# Patient Record
Sex: FEMALE | Race: WHITE | NOT HISPANIC OR LATINO
[De-identification: names, ages, dates, MRNs, and addresses within clinical notes are randomized per-mention and may not be internally consistent; named-entity substitution may affect disease eponyms.]

---

## 2018-09-16 ENCOUNTER — RX RENEWAL (OUTPATIENT)
Age: 83
End: 2018-09-16

## 2018-09-17 ENCOUNTER — APPOINTMENT (OUTPATIENT)
Dept: HEART AND VASCULAR | Facility: CLINIC | Age: 83
End: 2018-09-17

## 2018-10-10 ENCOUNTER — APPOINTMENT (OUTPATIENT)
Dept: HEART AND VASCULAR | Facility: CLINIC | Age: 83
End: 2018-10-10
Payer: MEDICARE

## 2018-10-10 ENCOUNTER — LABORATORY RESULT (OUTPATIENT)
Age: 83
End: 2018-10-10

## 2018-10-10 VITALS — DIASTOLIC BLOOD PRESSURE: 80 MMHG | SYSTOLIC BLOOD PRESSURE: 140 MMHG

## 2018-10-10 VITALS — WEIGHT: 165 LBS | BODY MASS INDEX: 27.49 KG/M2 | HEIGHT: 65 IN

## 2018-10-10 DIAGNOSIS — Z87.891 PERSONAL HISTORY OF NICOTINE DEPENDENCE: ICD-10-CM

## 2018-10-10 DIAGNOSIS — Z00.00 ENCOUNTER FOR GENERAL ADULT MEDICAL EXAMINATION W/OUT ABNORMAL FINDINGS: ICD-10-CM

## 2018-10-10 DIAGNOSIS — E03.9 HYPOTHYROIDISM, UNSPECIFIED: ICD-10-CM

## 2018-10-10 DIAGNOSIS — R09.89 OTHER SPECIFIED SYMPTOMS AND SIGNS INVOLVING THE CIRCULATORY AND RESPIRATORY SYSTEMS: ICD-10-CM

## 2018-10-10 DIAGNOSIS — E78.5 HYPERLIPIDEMIA, UNSPECIFIED: ICD-10-CM

## 2018-10-10 DIAGNOSIS — I25.2 OLD MYOCARDIAL INFARCTION: ICD-10-CM

## 2018-10-10 DIAGNOSIS — N18.9 CHRONIC KIDNEY DISEASE, UNSPECIFIED: ICD-10-CM

## 2018-10-10 DIAGNOSIS — I25.9 CHRONIC ISCHEMIC HEART DISEASE, UNSPECIFIED: ICD-10-CM

## 2018-10-10 DIAGNOSIS — R55 SYNCOPE AND COLLAPSE: ICD-10-CM

## 2018-10-10 PROCEDURE — 93880 EXTRACRANIAL BILAT STUDY: CPT

## 2018-10-10 PROCEDURE — 99214 OFFICE O/P EST MOD 30 MIN: CPT

## 2018-10-10 PROCEDURE — 93000 ELECTROCARDIOGRAM COMPLETE: CPT

## 2018-10-10 PROCEDURE — 93306 TTE W/DOPPLER COMPLETE: CPT

## 2018-10-10 RX ORDER — QUETIAPINE FUMARATE 25 MG/1
25 TABLET ORAL
Qty: 90 | Refills: 3 | Status: ACTIVE | COMMUNITY
Start: 2018-10-10 | End: 1900-01-01

## 2018-10-10 RX ORDER — LEVOTHYROXINE SODIUM 0.03 MG/1
25 TABLET ORAL DAILY
Qty: 90 | Refills: 3 | Status: ACTIVE | COMMUNITY
Start: 1900-01-01 | End: 1900-01-01

## 2018-10-11 LAB
ALBUMIN SERPL ELPH-MCNC: 4.2 G/DL
ALP BLD-CCNC: 87 U/L
ALT SERPL-CCNC: 9 U/L
ANION GAP SERPL CALC-SCNC: 17 MMOL/L
AST SERPL-CCNC: 13 U/L
BASOPHILS # BLD AUTO: 0.04 K/UL
BASOPHILS NFR BLD AUTO: 0.6 %
BILIRUB SERPL-MCNC: 0.4 MG/DL
BUN SERPL-MCNC: 22 MG/DL
CALCIUM SERPL-MCNC: 9.4 MG/DL
CHLORIDE SERPL-SCNC: 106 MMOL/L
CHOLEST SERPL-MCNC: 126 MG/DL
CHOLEST/HDLC SERPL: 3 RATIO
CO2 SERPL-SCNC: 20 MMOL/L
CREAT SERPL-MCNC: 1.82 MG/DL
EOSINOPHIL # BLD AUTO: 0.37 K/UL
EOSINOPHIL NFR BLD AUTO: 5.8 %
FOLATE SERPL-MCNC: 18.7 NG/ML
GLUCOSE SERPL-MCNC: 91 MG/DL
HBA1C MFR BLD HPLC: 5.4 %
HCT VFR BLD CALC: 36.6 %
HDLC SERPL-MCNC: 42 MG/DL
HGB BLD-MCNC: 11.3 G/DL
IMM GRANULOCYTES NFR BLD AUTO: 0.2 %
LDLC SERPL CALC-MCNC: 60 MG/DL
LYMPHOCYTES # BLD AUTO: 1.99 K/UL
LYMPHOCYTES NFR BLD AUTO: 31 %
MAN DIFF?: NORMAL
MCHC RBC-ENTMCNC: 30.5 PG
MCHC RBC-ENTMCNC: 30.9 GM/DL
MCV RBC AUTO: 98.9 FL
MONOCYTES # BLD AUTO: 0.48 K/UL
MONOCYTES NFR BLD AUTO: 7.5 %
NEUTROPHILS # BLD AUTO: 3.53 K/UL
NEUTROPHILS NFR BLD AUTO: 54.9 %
PLATELET # BLD AUTO: 171 K/UL
POTASSIUM SERPL-SCNC: 4.5 MMOL/L
PROT SERPL-MCNC: 7 G/DL
RBC # BLD: 3.7 M/UL
RBC # FLD: 14.3 %
SODIUM SERPL-SCNC: 143 MMOL/L
T3RU NFR SERPL: 1.16 INDEX
T4 FREE SERPL-MCNC: 0.9 NG/DL
TRIGL SERPL-MCNC: 120 MG/DL
TSH SERPL-ACNC: 8.93 UIU/ML
VIT B12 SERPL-MCNC: 449 PG/ML
WBC # FLD AUTO: 6.42 K/UL

## 2018-11-08 ENCOUNTER — RX RENEWAL (OUTPATIENT)
Age: 83
End: 2018-11-08

## 2019-01-10 ENCOUNTER — EMERGENCY (EMERGENCY)
Facility: HOSPITAL | Age: 84
LOS: 0 days | Discharge: HOME | End: 2019-01-10
Attending: EMERGENCY MEDICINE | Admitting: EMERGENCY MEDICINE

## 2019-01-10 VITALS
DIASTOLIC BLOOD PRESSURE: 78 MMHG | RESPIRATION RATE: 18 BRPM | SYSTOLIC BLOOD PRESSURE: 142 MMHG | TEMPERATURE: 96 F | HEART RATE: 99 BPM | OXYGEN SATURATION: 98 %

## 2019-01-10 VITALS
RESPIRATION RATE: 20 BRPM | SYSTOLIC BLOOD PRESSURE: 180 MMHG | TEMPERATURE: 98 F | DIASTOLIC BLOOD PRESSURE: 78 MMHG | OXYGEN SATURATION: 98 % | HEART RATE: 63 BPM

## 2019-01-10 DIAGNOSIS — K59.00 CONSTIPATION, UNSPECIFIED: ICD-10-CM

## 2019-01-10 DIAGNOSIS — Z88.0 ALLERGY STATUS TO PENICILLIN: ICD-10-CM

## 2019-01-10 DIAGNOSIS — I10 ESSENTIAL (PRIMARY) HYPERTENSION: ICD-10-CM

## 2019-01-10 DIAGNOSIS — E03.9 HYPOTHYROIDISM, UNSPECIFIED: ICD-10-CM

## 2019-01-10 DIAGNOSIS — Z91.012 ALLERGY TO EGGS: ICD-10-CM

## 2019-01-10 LAB
ALBUMIN SERPL ELPH-MCNC: 3.9 G/DL — SIGNIFICANT CHANGE UP (ref 3.5–5.2)
ALP SERPL-CCNC: 83 U/L — SIGNIFICANT CHANGE UP (ref 30–115)
ALT FLD-CCNC: 9 U/L — SIGNIFICANT CHANGE UP (ref 0–41)
ANION GAP SERPL CALC-SCNC: 19 MMOL/L — HIGH (ref 7–14)
AST SERPL-CCNC: 23 U/L — SIGNIFICANT CHANGE UP (ref 0–41)
BASOPHILS # BLD AUTO: 0.04 K/UL — SIGNIFICANT CHANGE UP (ref 0–0.2)
BASOPHILS NFR BLD AUTO: 0.6 % — SIGNIFICANT CHANGE UP (ref 0–1)
BILIRUB SERPL-MCNC: 0.5 MG/DL — SIGNIFICANT CHANGE UP (ref 0.2–1.2)
BUN SERPL-MCNC: 20 MG/DL — SIGNIFICANT CHANGE UP (ref 10–20)
CALCIUM SERPL-MCNC: 9.4 MG/DL — SIGNIFICANT CHANGE UP (ref 8.5–10.1)
CHLORIDE SERPL-SCNC: 102 MMOL/L — SIGNIFICANT CHANGE UP (ref 98–110)
CO2 SERPL-SCNC: 20 MMOL/L — SIGNIFICANT CHANGE UP (ref 17–32)
CREAT SERPL-MCNC: 2 MG/DL — HIGH (ref 0.7–1.5)
EOSINOPHIL # BLD AUTO: 0.11 K/UL — SIGNIFICANT CHANGE UP (ref 0–0.7)
EOSINOPHIL NFR BLD AUTO: 1.7 % — SIGNIFICANT CHANGE UP (ref 0–8)
GLUCOSE SERPL-MCNC: 101 MG/DL — HIGH (ref 70–99)
HCT VFR BLD CALC: 35.4 % — LOW (ref 37–47)
HGB BLD-MCNC: 11.5 G/DL — LOW (ref 12–16)
IMM GRANULOCYTES NFR BLD AUTO: 0.3 % — SIGNIFICANT CHANGE UP (ref 0.1–0.3)
LACTATE SERPL-SCNC: 1.5 MMOL/L — SIGNIFICANT CHANGE UP (ref 0.5–2.2)
LIDOCAIN IGE QN: 52 U/L — SIGNIFICANT CHANGE UP (ref 7–60)
LYMPHOCYTES # BLD AUTO: 1.65 K/UL — SIGNIFICANT CHANGE UP (ref 1.2–3.4)
LYMPHOCYTES # BLD AUTO: 25 % — SIGNIFICANT CHANGE UP (ref 20.5–51.1)
MCHC RBC-ENTMCNC: 29.6 PG — SIGNIFICANT CHANGE UP (ref 27–31)
MCHC RBC-ENTMCNC: 32.5 G/DL — SIGNIFICANT CHANGE UP (ref 32–37)
MCV RBC AUTO: 91.2 FL — SIGNIFICANT CHANGE UP (ref 81–99)
MONOCYTES # BLD AUTO: 0.54 K/UL — SIGNIFICANT CHANGE UP (ref 0.1–0.6)
MONOCYTES NFR BLD AUTO: 8.2 % — SIGNIFICANT CHANGE UP (ref 1.7–9.3)
NEUTROPHILS # BLD AUTO: 4.24 K/UL — SIGNIFICANT CHANGE UP (ref 1.4–6.5)
NEUTROPHILS NFR BLD AUTO: 64.2 % — SIGNIFICANT CHANGE UP (ref 42.2–75.2)
NRBC # BLD: 0 /100 WBCS — SIGNIFICANT CHANGE UP (ref 0–0)
PLATELET # BLD AUTO: 171 K/UL — SIGNIFICANT CHANGE UP (ref 130–400)
POTASSIUM SERPL-MCNC: 4.7 MMOL/L — SIGNIFICANT CHANGE UP (ref 3.5–5)
POTASSIUM SERPL-SCNC: 4.7 MMOL/L — SIGNIFICANT CHANGE UP (ref 3.5–5)
PROT SERPL-MCNC: 7.4 G/DL — SIGNIFICANT CHANGE UP (ref 6–8)
RBC # BLD: 3.88 M/UL — LOW (ref 4.2–5.4)
RBC # FLD: 14 % — SIGNIFICANT CHANGE UP (ref 11.5–14.5)
SODIUM SERPL-SCNC: 141 MMOL/L — SIGNIFICANT CHANGE UP (ref 135–146)
WBC # BLD: 6.6 K/UL — SIGNIFICANT CHANGE UP (ref 4.8–10.8)
WBC # FLD AUTO: 6.6 K/UL — SIGNIFICANT CHANGE UP (ref 4.8–10.8)

## 2019-01-10 RX ORDER — SODIUM CHLORIDE 9 MG/ML
1000 INJECTION, SOLUTION INTRAVENOUS
Qty: 0 | Refills: 0 | Status: DISCONTINUED | OUTPATIENT
Start: 2019-01-10 | End: 2019-01-10

## 2019-01-10 RX ORDER — SENNA PLUS 8.6 MG/1
2 TABLET ORAL ONCE
Qty: 0 | Refills: 0 | Status: COMPLETED | OUTPATIENT
Start: 2019-01-10 | End: 2019-01-10

## 2019-01-10 RX ORDER — MULTIVIT WITH MIN/MFOLATE/K2 340-15/3 G
0.5 POWDER (GRAM) ORAL ONCE
Qty: 0 | Refills: 0 | Status: COMPLETED | OUTPATIENT
Start: 2019-01-10 | End: 2019-01-10

## 2019-01-10 RX ORDER — SENNA PLUS 8.6 MG/1
2 TABLET ORAL
Qty: 20 | Refills: 0 | OUTPATIENT
Start: 2019-01-10 | End: 2019-01-19

## 2019-01-10 RX ORDER — IOHEXOL 300 MG/ML
30 INJECTION, SOLUTION INTRAVENOUS ONCE
Qty: 0 | Refills: 0 | Status: COMPLETED | OUTPATIENT
Start: 2019-01-10 | End: 2019-01-10

## 2019-01-10 RX ADMIN — SODIUM CHLORIDE 125 MILLILITER(S): 9 INJECTION, SOLUTION INTRAVENOUS at 15:18

## 2019-01-10 RX ADMIN — Medication 1 ENEMA: at 20:01

## 2019-01-10 RX ADMIN — IOHEXOL 30 MILLILITER(S): 300 INJECTION, SOLUTION INTRAVENOUS at 15:09

## 2019-01-10 RX ADMIN — SENNA PLUS 2 TABLET(S): 8.6 TABLET ORAL at 20:02

## 2019-01-10 RX ADMIN — Medication 0.5 BOTTLE: at 20:01

## 2019-01-10 NOTE — ED PROVIDER NOTE - PHYSICAL EXAMINATION
VITAL SIGNS: I have reviewed nursing notes and confirm.  CONSTITUTIONAL: Well-developed; well-nourished; in no acute distress.  SKIN: Skin exam is warm and dry, no acute rash.  HEAD: Normocephalic; atraumatic.  EYES: PERRL, EOM intact; conjunctiva and sclera clear.  ENT: No nasal discharge; airway clear. TMs clear.  NECK: Supple; non tender.  CARD: S1, S2 normal; no murmurs, gallops, or rubs. Regular rate and rhythm.  RESP: No wheezes, rales or rhonchi.  ABD: Normal bowel sounds; soft; non-distended; non-tender; +brown stool in rectum  EXT: Normal ROM. No clubbing, cyanosis or edema.

## 2019-01-10 NOTE — ED PROVIDER NOTE - PROGRESS NOTE DETAILS
ordered medications for constipation, pt prefers to take at home and have bm at home.  pt to f/u with dr. gonsalves
17-Feb-2018 11:27

## 2019-01-10 NOTE — ED PROVIDER NOTE - OBJECTIVE STATEMENT
86 yo f with pmh of cad on plavix, htn, hypothyroidism, presents with 1week of constipation.  pt has not had a bm x 1 week.  pt went to dr. James RYAN a few days ago and was told to double up on her miralax and colace.  pt admits feels distended, but no abd pain.  as per family, pt was very regular with her BM until sudden change 2 months ago.

## 2019-01-10 NOTE — ED PROVIDER NOTE - NS ED ROS FT
Review of Systems:  	•	CONSTITUTIONAL - no fever, no diaphoresis, no weight change  	•	SKIN - no rash  	•	HEMATOLOGIC - no bleeding, no bruising  	•	EYES - no eye pain, no blurred vision  	•	ENT - no change in hearing, no pain  	•	RESPIRATORY - no shortness of breath, no cough  	•	CARDIAC - no chest pain, no palpitations  	•	GI - no abd pain, no nausea, no vomiting, no diarrhea, + constipation, no bleeding  	•	 - no dysuria, no hematuria, no flank pain, no urinary retention  	•	MUSCULOSKELETAL - no joint paint, no swelling, no redness  	•	NEUROLOGIC - no weakness, no headache, no paresthesias, no dizziness  All other systems negative, unless specified in HPI

## 2019-01-10 NOTE — ED PROVIDER NOTE - NSFOLLOWUPINSTRUCTIONS_ED_ALL_ED_FT
Constipation    Constipation is when a person has fewer than three bowel movements a week, has difficulty having a bowel movement, or has stools that are dry, hard, or larger than normal. Other symptoms can include abdominal pain or bloating. As people grow older, constipation is more common. A low-fiber diet, not taking in enough fluids, and taking certain medicines, including opioid painkillers, may make constipation worse. Treatment varies but may include dietary modifications (more fiber-rich foods), lifestyle modifications, and possible medications.     SEEK IMMEDIATE MEDICAL CARE IF YOU HAVE ANY OF THE FOLLOWING SYMPTOMS: bright red blood in your stool, constipation for longer than 4 days, abdominal or rectal pain, unexplained weight loss, or inability to pass gas.

## 2019-01-10 NOTE — ED PROVIDER NOTE - MEDICAL DECISION MAKING DETAILS
pt with constipation x 1 week.  only tolerated initial fecal disimpaction.  labs show ckd and abd shows constipation.  pt will f/u with dr. gonsalves

## 2019-01-21 ENCOUNTER — RX RENEWAL (OUTPATIENT)
Age: 84
End: 2019-01-21

## 2019-01-29 ENCOUNTER — APPOINTMENT (OUTPATIENT)
Dept: HEART AND VASCULAR | Facility: CLINIC | Age: 84
End: 2019-01-29

## 2019-02-11 ENCOUNTER — RX RENEWAL (OUTPATIENT)
Age: 84
End: 2019-02-11

## 2019-03-02 ENCOUNTER — EMERGENCY (EMERGENCY)
Facility: HOSPITAL | Age: 84
LOS: 0 days | Discharge: HOME | End: 2019-03-02
Attending: EMERGENCY MEDICINE | Admitting: EMERGENCY MEDICINE

## 2019-03-02 VITALS
HEART RATE: 74 BPM | HEIGHT: 62 IN | WEIGHT: 139.99 LBS | DIASTOLIC BLOOD PRESSURE: 74 MMHG | RESPIRATION RATE: 18 BRPM | SYSTOLIC BLOOD PRESSURE: 178 MMHG | OXYGEN SATURATION: 97 % | TEMPERATURE: 99 F

## 2019-03-02 VITALS
DIASTOLIC BLOOD PRESSURE: 72 MMHG | SYSTOLIC BLOOD PRESSURE: 168 MMHG | OXYGEN SATURATION: 97 % | HEART RATE: 72 BPM | TEMPERATURE: 99 F | RESPIRATION RATE: 18 BRPM

## 2019-03-02 DIAGNOSIS — F03.90 UNSPECIFIED DEMENTIA WITHOUT BEHAVIORAL DISTURBANCE: ICD-10-CM

## 2019-03-02 DIAGNOSIS — Z79.899 OTHER LONG TERM (CURRENT) DRUG THERAPY: ICD-10-CM

## 2019-03-02 DIAGNOSIS — E03.9 HYPOTHYROIDISM, UNSPECIFIED: ICD-10-CM

## 2019-03-02 DIAGNOSIS — I10 ESSENTIAL (PRIMARY) HYPERTENSION: ICD-10-CM

## 2019-03-02 DIAGNOSIS — Z88.8 ALLERGY STATUS TO OTHER DRUGS, MEDICAMENTS AND BIOLOGICAL SUBSTANCES: ICD-10-CM

## 2019-03-02 DIAGNOSIS — G31.89 OTHER SPECIFIED DEGENERATIVE DISEASES OF NERVOUS SYSTEM: ICD-10-CM

## 2019-03-02 DIAGNOSIS — Z88.0 ALLERGY STATUS TO PENICILLIN: ICD-10-CM

## 2019-03-02 DIAGNOSIS — Z91.012 ALLERGY TO EGGS: ICD-10-CM

## 2019-03-02 LAB
ALBUMIN SERPL ELPH-MCNC: 3.9 G/DL — SIGNIFICANT CHANGE UP (ref 3.5–5.2)
ALP SERPL-CCNC: 67 U/L — SIGNIFICANT CHANGE UP (ref 30–115)
ALT FLD-CCNC: 7 U/L — SIGNIFICANT CHANGE UP (ref 0–41)
ANION GAP SERPL CALC-SCNC: 9 MMOL/L — SIGNIFICANT CHANGE UP (ref 7–14)
APPEARANCE UR: CLEAR — SIGNIFICANT CHANGE UP
APTT BLD: 29.7 SEC — SIGNIFICANT CHANGE UP (ref 27–39.2)
AST SERPL-CCNC: 15 U/L — SIGNIFICANT CHANGE UP (ref 0–41)
BACTERIA # UR AUTO: ABNORMAL
BASOPHILS # BLD AUTO: 0.05 K/UL — SIGNIFICANT CHANGE UP (ref 0–0.2)
BASOPHILS NFR BLD AUTO: 0.8 % — SIGNIFICANT CHANGE UP (ref 0–1)
BILIRUB SERPL-MCNC: 0.5 MG/DL — SIGNIFICANT CHANGE UP (ref 0.2–1.2)
BILIRUB UR-MCNC: NEGATIVE — SIGNIFICANT CHANGE UP
BUN SERPL-MCNC: 19 MG/DL — SIGNIFICANT CHANGE UP (ref 10–20)
CALCIUM SERPL-MCNC: 9.2 MG/DL — SIGNIFICANT CHANGE UP (ref 8.5–10.1)
CHLORIDE SERPL-SCNC: 107 MMOL/L — SIGNIFICANT CHANGE UP (ref 98–110)
CO2 SERPL-SCNC: 26 MMOL/L — SIGNIFICANT CHANGE UP (ref 17–32)
COD CRY URNS QL: NEGATIVE — SIGNIFICANT CHANGE UP
COLOR SPEC: YELLOW — SIGNIFICANT CHANGE UP
CREAT SERPL-MCNC: 1.5 MG/DL — SIGNIFICANT CHANGE UP (ref 0.7–1.5)
DIFF PNL FLD: NEGATIVE — SIGNIFICANT CHANGE UP
EOSINOPHIL # BLD AUTO: 0.33 K/UL — SIGNIFICANT CHANGE UP (ref 0–0.7)
EOSINOPHIL NFR BLD AUTO: 5.5 % — SIGNIFICANT CHANGE UP (ref 0–8)
EPI CELLS # UR: ABNORMAL /HPF
GLUCOSE SERPL-MCNC: 103 MG/DL — HIGH (ref 70–99)
GLUCOSE UR QL: NEGATIVE MG/DL — SIGNIFICANT CHANGE UP
GRAN CASTS # UR COMP ASSIST: NEGATIVE — SIGNIFICANT CHANGE UP
HCT VFR BLD CALC: 36.6 % — LOW (ref 37–47)
HGB BLD-MCNC: 11.5 G/DL — LOW (ref 12–16)
HYALINE CASTS # UR AUTO: NEGATIVE — SIGNIFICANT CHANGE UP
IMM GRANULOCYTES NFR BLD AUTO: 0.2 % — SIGNIFICANT CHANGE UP (ref 0.1–0.3)
INR BLD: 1.01 RATIO — SIGNIFICANT CHANGE UP (ref 0.65–1.3)
KETONES UR-MCNC: ABNORMAL
LACTATE SERPL-SCNC: 1.1 MMOL/L — SIGNIFICANT CHANGE UP (ref 0.5–2.2)
LEUKOCYTE ESTERASE UR-ACNC: NEGATIVE — SIGNIFICANT CHANGE UP
LIDOCAIN IGE QN: 58 U/L — SIGNIFICANT CHANGE UP (ref 7–60)
LYMPHOCYTES # BLD AUTO: 1.62 K/UL — SIGNIFICANT CHANGE UP (ref 1.2–3.4)
LYMPHOCYTES # BLD AUTO: 27 % — SIGNIFICANT CHANGE UP (ref 20.5–51.1)
MAGNESIUM SERPL-MCNC: 1.9 MG/DL — SIGNIFICANT CHANGE UP (ref 1.8–2.4)
MCHC RBC-ENTMCNC: 29.7 PG — SIGNIFICANT CHANGE UP (ref 27–31)
MCHC RBC-ENTMCNC: 31.4 G/DL — LOW (ref 32–37)
MCV RBC AUTO: 94.6 FL — SIGNIFICANT CHANGE UP (ref 81–99)
MONOCYTES # BLD AUTO: 0.53 K/UL — SIGNIFICANT CHANGE UP (ref 0.1–0.6)
MONOCYTES NFR BLD AUTO: 8.8 % — SIGNIFICANT CHANGE UP (ref 1.7–9.3)
NEUTROPHILS # BLD AUTO: 3.46 K/UL — SIGNIFICANT CHANGE UP (ref 1.4–6.5)
NEUTROPHILS NFR BLD AUTO: 57.7 % — SIGNIFICANT CHANGE UP (ref 42.2–75.2)
NITRITE UR-MCNC: NEGATIVE — SIGNIFICANT CHANGE UP
NRBC # BLD: 0 /100 WBCS — SIGNIFICANT CHANGE UP (ref 0–0)
PH UR: 6 — SIGNIFICANT CHANGE UP (ref 5–8)
PLATELET # BLD AUTO: 169 K/UL — SIGNIFICANT CHANGE UP (ref 130–400)
POTASSIUM SERPL-MCNC: 3.8 MMOL/L — SIGNIFICANT CHANGE UP (ref 3.5–5)
POTASSIUM SERPL-SCNC: 3.8 MMOL/L — SIGNIFICANT CHANGE UP (ref 3.5–5)
PROT SERPL-MCNC: 6.6 G/DL — SIGNIFICANT CHANGE UP (ref 6–8)
PROT UR-MCNC: 30 MG/DL
PROTHROM AB SERPL-ACNC: 11.6 SEC — SIGNIFICANT CHANGE UP (ref 9.95–12.87)
RBC # BLD: 3.87 M/UL — LOW (ref 4.2–5.4)
RBC # FLD: 14.3 % — SIGNIFICANT CHANGE UP (ref 11.5–14.5)
RBC CASTS # UR COMP ASSIST: NEGATIVE — SIGNIFICANT CHANGE UP
SODIUM SERPL-SCNC: 142 MMOL/L — SIGNIFICANT CHANGE UP (ref 135–146)
SP GR SPEC: 1.02 — SIGNIFICANT CHANGE UP (ref 1.01–1.03)
TRI-PHOS CRY UR QL COMP ASSIST: NEGATIVE — SIGNIFICANT CHANGE UP
TROPONIN T SERPL-MCNC: <0.01 NG/ML — SIGNIFICANT CHANGE UP
URATE CRY FLD QL MICRO: NEGATIVE — SIGNIFICANT CHANGE UP
UROBILINOGEN FLD QL: 1 MG/DL (ref 0.2–0.2)
WBC # BLD: 6 K/UL — SIGNIFICANT CHANGE UP (ref 4.8–10.8)
WBC # FLD AUTO: 6 K/UL — SIGNIFICANT CHANGE UP (ref 4.8–10.8)
WBC UR QL: SIGNIFICANT CHANGE UP /HPF

## 2019-03-02 NOTE — ED PROVIDER NOTE - OBJECTIVE STATEMENT
Patient brought in  by family for worsening dementia over several mos, Seen by Dr Schafer, neuro, Dx with ischemic dementia. Family states over past 2 weeks Sx seem worse , patient eating and drinking less. Worried she has an infection somewhere. No fever, no chest pain, no SOB, no change in her normal gait

## 2019-03-02 NOTE — ED PROVIDER NOTE - ATTENDING CONTRIBUTION TO CARE
Pt is a 88yo female with hx of dementia followed by Dr. Schafer with progressive decline in neuro status and worsening confusion over the last two weeks. n o acute change today but a visiting neice mentioned that a UTI could cause confusion so they came to ED to r/o that.  No fever.    Exam: soft NT abdomen, NAD, RRR, CTAB, moving all extremities, calm, pelasant  Imp: r/o UTI  Plan: ua, ct head, neuro f/u

## 2019-03-02 NOTE — ED ADULT NURSE NOTE - NSIMPLEMENTINTERV_GEN_ALL_ED
Implemented All Fall with Harm Risk Interventions:  Bend to call system. Call bell, personal items and telephone within reach. Instruct patient to call for assistance. Room bathroom lighting operational. Non-slip footwear when patient is off stretcher. Physically safe environment: no spills, clutter or unnecessary equipment. Stretcher in lowest position, wheels locked, appropriate side rails in place. Provide visual cue, wrist band, yellow gown, etc. Monitor gait and stability. Monitor for mental status changes and reorient to person, place, and time. Review medications for side effects contributing to fall risk. Reinforce activity limits and safety measures with patient and family. Provide visual clues: red socks.

## 2019-03-02 NOTE — ED ADULT TRIAGE NOTE - CHIEF COMPLAINT QUOTE
biba from home, per family "not making sense", hx of dementia, pt having difficulty ambulating, fall this month out of bed, no onjuries, not eating well, c/o nasuea.

## 2019-03-02 NOTE — ED PROVIDER NOTE - CARE PROVIDER_API CALL
Derrick Schafer)  Neurology  [address] [place] [address]  Phone: [phone]  Fax: [phone]  Follow Up Time: 1-3 Days

## 2019-03-03 LAB
CULTURE RESULTS: NO GROWTH — SIGNIFICANT CHANGE UP
SPECIMEN SOURCE: SIGNIFICANT CHANGE UP

## 2019-03-12 ENCOUNTER — INPATIENT (INPATIENT)
Facility: HOSPITAL | Age: 84
LOS: 2 days | Discharge: NURSING, REHAB OR LONG-TERM CARE | End: 2019-03-15
Attending: HOSPITALIST | Admitting: HOSPITALIST

## 2019-03-12 VITALS
HEART RATE: 85 BPM | SYSTOLIC BLOOD PRESSURE: 227 MMHG | WEIGHT: 139.99 LBS | TEMPERATURE: 98 F | OXYGEN SATURATION: 97 % | DIASTOLIC BLOOD PRESSURE: 115 MMHG | HEIGHT: 62 IN | RESPIRATION RATE: 19 BRPM

## 2019-03-12 PROBLEM — F03.90 UNSPECIFIED DEMENTIA WITHOUT BEHAVIORAL DISTURBANCE: Chronic | Status: ACTIVE | Noted: 2019-03-02

## 2019-03-12 PROBLEM — E03.9 HYPOTHYROIDISM, UNSPECIFIED: Chronic | Status: ACTIVE | Noted: 2019-03-02

## 2019-03-12 PROBLEM — I10 ESSENTIAL (PRIMARY) HYPERTENSION: Chronic | Status: ACTIVE | Noted: 2019-03-02

## 2019-03-12 LAB
ALBUMIN SERPL ELPH-MCNC: 3.9 G/DL — SIGNIFICANT CHANGE UP (ref 3.5–5.2)
ALP SERPL-CCNC: 63 U/L — SIGNIFICANT CHANGE UP (ref 30–115)
ALT FLD-CCNC: 9 U/L — SIGNIFICANT CHANGE UP (ref 0–41)
ANION GAP SERPL CALC-SCNC: 13 MMOL/L — SIGNIFICANT CHANGE UP (ref 7–14)
APPEARANCE UR: CLEAR — SIGNIFICANT CHANGE UP
APTT BLD: 31.7 SEC — SIGNIFICANT CHANGE UP (ref 27–39.2)
AST SERPL-CCNC: 15 U/L — SIGNIFICANT CHANGE UP (ref 0–41)
BACTERIA # UR AUTO: ABNORMAL
BASOPHILS # BLD AUTO: 0.02 K/UL — SIGNIFICANT CHANGE UP (ref 0–0.2)
BASOPHILS NFR BLD AUTO: 0.4 % — SIGNIFICANT CHANGE UP (ref 0–1)
BILIRUB SERPL-MCNC: 0.9 MG/DL — SIGNIFICANT CHANGE UP (ref 0.2–1.2)
BILIRUB UR-MCNC: ABNORMAL
BUN SERPL-MCNC: 27 MG/DL — HIGH (ref 10–20)
CALCIUM SERPL-MCNC: 9.4 MG/DL — SIGNIFICANT CHANGE UP (ref 8.5–10.1)
CHLORIDE SERPL-SCNC: 106 MMOL/L — SIGNIFICANT CHANGE UP (ref 98–110)
CO2 SERPL-SCNC: 26 MMOL/L — SIGNIFICANT CHANGE UP (ref 17–32)
COD CRY URNS QL: NEGATIVE — SIGNIFICANT CHANGE UP
COLOR SPEC: YELLOW — SIGNIFICANT CHANGE UP
CREAT SERPL-MCNC: 1.4 MG/DL — SIGNIFICANT CHANGE UP (ref 0.7–1.5)
DIFF PNL FLD: ABNORMAL
EOSINOPHIL # BLD AUTO: 0.22 K/UL — SIGNIFICANT CHANGE UP (ref 0–0.7)
EOSINOPHIL NFR BLD AUTO: 4 % — SIGNIFICANT CHANGE UP (ref 0–8)
EPI CELLS # UR: ABNORMAL /HPF
GLUCOSE SERPL-MCNC: 94 MG/DL — SIGNIFICANT CHANGE UP (ref 70–99)
GLUCOSE UR QL: NEGATIVE MG/DL — SIGNIFICANT CHANGE UP
GRAN CASTS # UR COMP ASSIST: NEGATIVE — SIGNIFICANT CHANGE UP
HCT VFR BLD CALC: 37 % — SIGNIFICANT CHANGE UP (ref 37–47)
HGB BLD-MCNC: 11.7 G/DL — LOW (ref 12–16)
HYALINE CASTS # UR AUTO: NEGATIVE — SIGNIFICANT CHANGE UP
IMM GRANULOCYTES NFR BLD AUTO: 0.4 % — HIGH (ref 0.1–0.3)
INR BLD: 1.1 RATIO — SIGNIFICANT CHANGE UP (ref 0.65–1.3)
KETONES UR-MCNC: 15
LEUKOCYTE ESTERASE UR-ACNC: ABNORMAL
LYMPHOCYTES # BLD AUTO: 1.57 K/UL — SIGNIFICANT CHANGE UP (ref 1.2–3.4)
LYMPHOCYTES # BLD AUTO: 28.6 % — SIGNIFICANT CHANGE UP (ref 20.5–51.1)
MCHC RBC-ENTMCNC: 29.4 PG — SIGNIFICANT CHANGE UP (ref 27–31)
MCHC RBC-ENTMCNC: 31.6 G/DL — LOW (ref 32–37)
MCV RBC AUTO: 93 FL — SIGNIFICANT CHANGE UP (ref 81–99)
MONOCYTES # BLD AUTO: 0.53 K/UL — SIGNIFICANT CHANGE UP (ref 0.1–0.6)
MONOCYTES NFR BLD AUTO: 9.7 % — HIGH (ref 1.7–9.3)
NEUTROPHILS # BLD AUTO: 3.12 K/UL — SIGNIFICANT CHANGE UP (ref 1.4–6.5)
NEUTROPHILS NFR BLD AUTO: 56.9 % — SIGNIFICANT CHANGE UP (ref 42.2–75.2)
NITRITE UR-MCNC: NEGATIVE — SIGNIFICANT CHANGE UP
NRBC # BLD: 0 /100 WBCS — SIGNIFICANT CHANGE UP (ref 0–0)
PH UR: 5.5 — SIGNIFICANT CHANGE UP (ref 5–8)
PLATELET # BLD AUTO: 167 K/UL — SIGNIFICANT CHANGE UP (ref 130–400)
POTASSIUM SERPL-MCNC: 4 MMOL/L — SIGNIFICANT CHANGE UP (ref 3.5–5)
POTASSIUM SERPL-SCNC: 4 MMOL/L — SIGNIFICANT CHANGE UP (ref 3.5–5)
PROT SERPL-MCNC: 6.7 G/DL — SIGNIFICANT CHANGE UP (ref 6–8)
PROT UR-MCNC: 100 MG/DL
PROTHROM AB SERPL-ACNC: 12.6 SEC — SIGNIFICANT CHANGE UP (ref 9.95–12.87)
RBC # BLD: 3.98 M/UL — LOW (ref 4.2–5.4)
RBC # FLD: 14.3 % — SIGNIFICANT CHANGE UP (ref 11.5–14.5)
RBC CASTS # UR COMP ASSIST: SIGNIFICANT CHANGE UP /HPF
SODIUM SERPL-SCNC: 145 MMOL/L — SIGNIFICANT CHANGE UP (ref 135–146)
SP GR SPEC: >=1.03 (ref 1.01–1.03)
TRI-PHOS CRY UR QL COMP ASSIST: NEGATIVE — SIGNIFICANT CHANGE UP
TROPONIN T SERPL-MCNC: <0.01 NG/ML — SIGNIFICANT CHANGE UP
URATE CRY FLD QL MICRO: NEGATIVE — SIGNIFICANT CHANGE UP
UROBILINOGEN FLD QL: 1 MG/DL (ref 0.2–0.2)
WBC # BLD: 5.48 K/UL — SIGNIFICANT CHANGE UP (ref 4.8–10.8)
WBC # FLD AUTO: 5.48 K/UL — SIGNIFICANT CHANGE UP (ref 4.8–10.8)
WBC UR QL: SIGNIFICANT CHANGE UP /HPF

## 2019-03-12 RX ORDER — POLYETHYLENE GLYCOL 3350 17 G/17G
17 POWDER, FOR SOLUTION ORAL DAILY
Qty: 0 | Refills: 0 | Status: DISCONTINUED | OUTPATIENT
Start: 2019-03-12 | End: 2019-03-15

## 2019-03-12 RX ORDER — LISINOPRIL 2.5 MG/1
10 TABLET ORAL DAILY
Qty: 0 | Refills: 0 | Status: DISCONTINUED | OUTPATIENT
Start: 2019-03-12 | End: 2019-03-15

## 2019-03-12 RX ORDER — DOCUSATE SODIUM 100 MG
100 CAPSULE ORAL
Qty: 0 | Refills: 0 | Status: DISCONTINUED | OUTPATIENT
Start: 2019-03-12 | End: 2019-03-15

## 2019-03-12 RX ORDER — HEPARIN SODIUM 5000 [USP'U]/ML
5000 INJECTION INTRAVENOUS; SUBCUTANEOUS EVERY 12 HOURS
Qty: 0 | Refills: 0 | Status: DISCONTINUED | OUTPATIENT
Start: 2019-03-12 | End: 2019-03-15

## 2019-03-12 RX ORDER — SODIUM CHLORIDE 9 MG/ML
1000 INJECTION INTRAMUSCULAR; INTRAVENOUS; SUBCUTANEOUS
Qty: 0 | Refills: 0 | Status: DISCONTINUED | OUTPATIENT
Start: 2019-03-12 | End: 2019-03-13

## 2019-03-12 RX ORDER — SENNA PLUS 8.6 MG/1
2 TABLET ORAL AT BEDTIME
Qty: 0 | Refills: 0 | Status: DISCONTINUED | OUTPATIENT
Start: 2019-03-12 | End: 2019-03-15

## 2019-03-12 RX ORDER — ACETAMINOPHEN 500 MG
650 TABLET ORAL EVERY 6 HOURS
Qty: 0 | Refills: 0 | Status: DISCONTINUED | OUTPATIENT
Start: 2019-03-12 | End: 2019-03-15

## 2019-03-12 RX ORDER — LEVOTHYROXINE SODIUM 125 MCG
25 TABLET ORAL DAILY
Qty: 0 | Refills: 0 | Status: DISCONTINUED | OUTPATIENT
Start: 2019-03-12 | End: 2019-03-15

## 2019-03-12 RX ORDER — METOPROLOL TARTRATE 50 MG
25 TABLET ORAL
Qty: 0 | Refills: 0 | Status: DISCONTINUED | OUTPATIENT
Start: 2019-03-12 | End: 2019-03-15

## 2019-03-12 RX ORDER — CLOPIDOGREL BISULFATE 75 MG/1
75 TABLET, FILM COATED ORAL DAILY
Qty: 0 | Refills: 0 | Status: DISCONTINUED | OUTPATIENT
Start: 2019-03-12 | End: 2019-03-15

## 2019-03-12 RX ORDER — LANOLIN ALCOHOL/MO/W.PET/CERES
3 CREAM (GRAM) TOPICAL ONCE
Qty: 0 | Refills: 0 | Status: COMPLETED | OUTPATIENT
Start: 2019-03-12 | End: 2019-03-12

## 2019-03-12 RX ORDER — ATORVASTATIN CALCIUM 80 MG/1
20 TABLET, FILM COATED ORAL AT BEDTIME
Qty: 0 | Refills: 0 | Status: DISCONTINUED | OUTPATIENT
Start: 2019-03-12 | End: 2019-03-15

## 2019-03-12 RX ADMIN — ATORVASTATIN CALCIUM 20 MILLIGRAM(S): 80 TABLET, FILM COATED ORAL at 23:02

## 2019-03-12 RX ADMIN — SENNA PLUS 2 TABLET(S): 8.6 TABLET ORAL at 23:02

## 2019-03-12 RX ADMIN — Medication 3 MILLIGRAM(S): at 23:02

## 2019-03-12 NOTE — ED ADULT TRIAGE NOTE - CHIEF COMPLAINT QUOTE
BIBA for failure to thrive. Pt daughter states pt cant perform tasks anymore, not eating or drinking. BIBA for failure to thrive. Pt daughter states pt cant perform tasks anymore, not eating or drinking. Pt daughter states pt been having TIA

## 2019-03-12 NOTE — H&P ADULT - HISTORY OF PRESENT ILLNESS
87 year old female with past medical history of vascular dementia, HTN, CAD s/p PCI, and hypothyroidism BIB daughter for failure to thrive. Patient has deteriorated over the past few weeks. She has decreased PO intake, decreased activity, and worsening of her baseline mental status. She has also been have frequent falls and weakness.   As per daughter pt is eating 2 tbs per meal and fallen x2 over the last wk.   She follows with neurology for dementia.   Denies CP, SOB, palpitations, fevers, chills, dysuria, and abd pain.

## 2019-03-12 NOTE — ED PROVIDER NOTE - CLINICAL SUMMARY MEDICAL DECISION MAKING FREE TEXT BOX
EKG unchanged, CTH unchanged. Lab work notable for bilirubin in urine without ruq ttp, abn liver or bili on bw. admitted to medicine for further evaluation of anorexia and ambulation dysfunction.

## 2019-03-12 NOTE — PATIENT PROFILE ADULT - NSTRANSFERDENTURES_GEN_A_NUR
upper/lower/full/not in mouth pt refuses , sent to  405-1 w/pt on room change, denture container labeled in property bag

## 2019-03-12 NOTE — H&P ADULT - NSHPPHYSICALEXAM_GEN_ALL_CORE
PHYSICAL EXAM:  GENERAL: NAD, elderly famale   HEAD:  Atraumatic, Normocephalic  EYES: EOMI, PERRLA, conjunctiva and sclera clear  NECK: Supple, No JVD  CHEST/LUNG: Clear to auscultation bilaterally; No wheeze  HEART: Regular rate and rhythm; No murmurs, rubs, or gallops  ABDOMEN: Soft, Nontender, Nondistended; Bowel sounds present  EXTREMITIES:  2+ Peripheral Pulses, No clubbing, cyanosis, or edema  NEUROLOGY: moving all extremities spontaneously   SKIN: No rashes or lesions

## 2019-03-12 NOTE — ED PROVIDER NOTE - CARE PLAN
Principal Discharge DX:	Lightheadedness  Secondary Diagnosis:	Decreased ambulation status Principal Discharge DX:	Lightheadedness  Secondary Diagnosis:	Decreased ambulation status  Secondary Diagnosis:	Weight loss, non-intentional

## 2019-03-12 NOTE — H&P ADULT - NSHPLABSRESULTS_GEN_ALL_CORE
CBC Full  -  ( 12 Mar 2019 15:40 )  WBC Count : 5.48 K/uL  Hemoglobin : 11.7 g/dL  Hematocrit : 37.0 %  Platelet Count - Automated : 167 K/uL  Mean Cell Volume : 93.0 fL  Mean Cell Hemoglobin : 29.4 pg  Mean Cell Hemoglobin Concentration : 31.6 g/dL  Auto Neutrophil # : 3.12 K/uL  Auto Lymphocyte # : 1.57 K/uL  Auto Monocyte # : 0.53 K/uL  Auto Eosinophil # : 0.22 K/uL  Auto Basophil # : 0.02 K/uL  Auto Neutrophil % : 56.9 %  Auto Lymphocyte % : 28.6 %  Auto Monocyte % : 9.7 %  Auto Eosinophil % : 4.0 %  Auto Basophil % : 0.4 %    BMP:    03-12 @ 15:40    Blood Urea Nitrogen - 27  Calcium - 9.4  Carbon Dioxide - 26  Chloride - 106  Creatinine - 1.4  Glucose - 94  Potassium - 4.0  Sodium - 145      Hemoglobin A1c -   PT/INR - ( 12 Mar 2019 15:40 )   PT: 12.60 sec;   INR: 1.10 ratio         PTT - ( 12 Mar 2019 15:40 )  PTT:31.7 sec

## 2019-03-12 NOTE — ED ADULT NURSE NOTE - CHIEF COMPLAINT QUOTE
BIBA for failure to thrive. Pt daughter states pt cant perform tasks anymore, not eating or drinking. Pt daughter states pt been having TIA

## 2019-03-12 NOTE — ED PROVIDER NOTE - PHYSICAL EXAMINATION
Physical Exam    Vital Signs: I have reviewed the initial vital signs.  Constitutional: well-nourished, appears stated age, no acute distress  Eyes: PERRLA, and symmetrical lids.  ENT: Neck supple with no adenopathy, moist MM.  Cardiovascular: regular rate, regular rhythm, well-perfused extremities  Respiratory: unlabored respiratory effort, clear to auscultation bilaterally  Gastrointestinal: soft, non-tender abdomen, no pulsatile mass  Musculoskeletal: supple neck, no lower extremity edema  Integumentary: warm, dry, no rash  Neurologic: awake, alert, cranial nerves II-XII grossly intact, extremities’ motor and sensory functions grossly intact  Psychiatric: A&Ox1, sad mood, flat affect

## 2019-03-12 NOTE — CHART NOTE - NSCHARTNOTEFT_GEN_A_CORE
Family concerned that patient is developing dementia and would like testing for this. They are asking for day staff to call them with follow-up

## 2019-03-12 NOTE — ED PROVIDER NOTE - ATTENDING CONTRIBUTION TO CARE
pt with decreased po intake, generalized weakness, progressive since december, now inability to ambulate. cared for at home by daughters and , Daughter at bedside notices increased confusion (cannot remember how to put in dentures), states mother still will answer questions appropriately. No fever or chills, no dirrhea, +constipation (on bowel regimen). +30lbs weight loss. Pt states she does not "feel like eating."    Exam notable for non-toxic appearing female, nad, rrr, clear lungs unlabored, abd s no focal tenderness. mAEx4, nl tone.  - failure to thrive vs generalized weakness - eval for infection, acs, less likely cva - labs, cth, ekg, trp, ua, reassess.

## 2019-03-12 NOTE — H&P ADULT - ASSESSMENT
87 year old female with past medical history of vascular dementia, HTN, CAD s/p PCI, and hypothyroidism BIB daughter for failure to thrive. Patient has deteriorated over the past few weeks. She has decreased PO intake, decreased activity, and worsening of her baseline mental status. She has also been have frequent falls and weakness.   As per daughter pt is eating 2 tbs per meal and fallen x2 over the last wk.   She follows with neurology for dementia.       Problem List:  #FTT  #Dehydration   #Vascular Dementia   #HTN  #CAD s/p PCI   #Hypothyroidism   #CKDIII   #Chronic Anemia   #AAA - 4.2 cm     Plan:  - IVF  - rehab/PT   - Geriatric consult   - c/w home meds   - dvt ppx     Dispo: STR - daughter (caretaker) in agreement

## 2019-03-12 NOTE — ED PROVIDER NOTE - OBJECTIVE STATEMENT
86 yo F pmhx sig for HTN, hypothyroidism, and dementia BIB daughter with progressive worsening mental status with forgetfulness and mild confusion and decreased PO intake with difficulty ambulating with frequent falls. As per daughter pt is eating 2 tbs per meal and falling x2 over the last wk. Pt has been having increasing confusion and short term memory loss. Pt reports difficulty standing, as per pt I am afraid I will fall down. Denies CP, SOB, palpitations, fevers, chills, dysuria, and abd pain.    I have reviewed available current nursing and previous documentation of past medical, surgical, family, and/or social history.

## 2019-03-12 NOTE — ED PROVIDER NOTE - NS ED ROS FT
Review of Systems    Constitutional: (-) fever  Eyes/ENT: (-) change in vision, (-) sore throat, (-) ear pain  Cardiovascular: (-) chest pain, (-) palpitation   Respiratory: (-) cough, (-) shortness of breath  Gastrointestinal: (-) abdominal pain (-) vomiting, (-) diarrhea  Musculoskeletal: (-) neck pain, (-) back pain, (-) joint pain  Integumentary: (-) rash, (-) edema  Neurological: (-) headache, (-) altered mental status  Heme/Lymph: (+) easy bruising  Allergic/Immunologic: (-) pruritus

## 2019-03-13 LAB
ALBUMIN SERPL ELPH-MCNC: 3.5 G/DL — SIGNIFICANT CHANGE UP (ref 3.5–5.2)
ALP SERPL-CCNC: 58 U/L — SIGNIFICANT CHANGE UP (ref 30–115)
ALT FLD-CCNC: 8 U/L — SIGNIFICANT CHANGE UP (ref 0–41)
ANION GAP SERPL CALC-SCNC: 12 MMOL/L — SIGNIFICANT CHANGE UP (ref 7–14)
AST SERPL-CCNC: 14 U/L — SIGNIFICANT CHANGE UP (ref 0–41)
BILIRUB SERPL-MCNC: 0.8 MG/DL — SIGNIFICANT CHANGE UP (ref 0.2–1.2)
BUN SERPL-MCNC: 23 MG/DL — HIGH (ref 10–20)
CALCIUM SERPL-MCNC: 8.6 MG/DL — SIGNIFICANT CHANGE UP (ref 8.5–10.1)
CHLORIDE SERPL-SCNC: 110 MMOL/L — SIGNIFICANT CHANGE UP (ref 98–110)
CO2 SERPL-SCNC: 24 MMOL/L — SIGNIFICANT CHANGE UP (ref 17–32)
CREAT SERPL-MCNC: 1.1 MG/DL — SIGNIFICANT CHANGE UP (ref 0.7–1.5)
GLUCOSE SERPL-MCNC: 81 MG/DL — SIGNIFICANT CHANGE UP (ref 70–99)
HCT VFR BLD CALC: 35.2 % — LOW (ref 37–47)
HGB BLD-MCNC: 11.2 G/DL — LOW (ref 12–16)
MCHC RBC-ENTMCNC: 29.7 PG — SIGNIFICANT CHANGE UP (ref 27–31)
MCHC RBC-ENTMCNC: 31.8 G/DL — LOW (ref 32–37)
MCV RBC AUTO: 93.4 FL — SIGNIFICANT CHANGE UP (ref 81–99)
NRBC # BLD: 0 /100 WBCS — SIGNIFICANT CHANGE UP (ref 0–0)
PLATELET # BLD AUTO: 147 K/UL — SIGNIFICANT CHANGE UP (ref 130–400)
POTASSIUM SERPL-MCNC: 3.4 MMOL/L — LOW (ref 3.5–5)
POTASSIUM SERPL-SCNC: 3.4 MMOL/L — LOW (ref 3.5–5)
PROT SERPL-MCNC: 5.8 G/DL — LOW (ref 6–8)
RBC # BLD: 3.77 M/UL — LOW (ref 4.2–5.4)
RBC # FLD: 14.3 % — SIGNIFICANT CHANGE UP (ref 11.5–14.5)
SODIUM SERPL-SCNC: 146 MMOL/L — SIGNIFICANT CHANGE UP (ref 135–146)
WBC # BLD: 5 K/UL — SIGNIFICANT CHANGE UP (ref 4.8–10.8)
WBC # FLD AUTO: 5 K/UL — SIGNIFICANT CHANGE UP (ref 4.8–10.8)

## 2019-03-13 RX ORDER — HYDRALAZINE HCL 50 MG
10 TABLET ORAL ONCE
Qty: 0 | Refills: 0 | Status: COMPLETED | OUTPATIENT
Start: 2019-03-13 | End: 2019-03-13

## 2019-03-13 RX ORDER — NITROGLYCERIN 6.5 MG
1 CAPSULE, EXTENDED RELEASE ORAL ONCE
Qty: 0 | Refills: 0 | Status: COMPLETED | OUTPATIENT
Start: 2019-03-13 | End: 2019-03-13

## 2019-03-13 RX ORDER — LABETALOL HCL 100 MG
10 TABLET ORAL ONCE
Qty: 0 | Refills: 0 | Status: COMPLETED | OUTPATIENT
Start: 2019-03-13 | End: 2019-03-13

## 2019-03-13 RX ORDER — POTASSIUM CHLORIDE 20 MEQ
40 PACKET (EA) ORAL ONCE
Qty: 0 | Refills: 0 | Status: COMPLETED | OUTPATIENT
Start: 2019-03-13 | End: 2019-03-13

## 2019-03-13 RX ADMIN — Medication 40 MILLIEQUIVALENT(S): at 10:37

## 2019-03-13 RX ADMIN — LISINOPRIL 10 MILLIGRAM(S): 2.5 TABLET ORAL at 06:07

## 2019-03-13 RX ADMIN — ATORVASTATIN CALCIUM 20 MILLIGRAM(S): 80 TABLET, FILM COATED ORAL at 21:56

## 2019-03-13 RX ADMIN — HEPARIN SODIUM 5000 UNIT(S): 5000 INJECTION INTRAVENOUS; SUBCUTANEOUS at 06:07

## 2019-03-13 RX ADMIN — Medication 100 MILLIGRAM(S): at 17:00

## 2019-03-13 RX ADMIN — HEPARIN SODIUM 5000 UNIT(S): 5000 INJECTION INTRAVENOUS; SUBCUTANEOUS at 17:01

## 2019-03-13 RX ADMIN — Medication 10 MILLIGRAM(S): at 20:26

## 2019-03-13 RX ADMIN — SENNA PLUS 2 TABLET(S): 8.6 TABLET ORAL at 21:56

## 2019-03-13 RX ADMIN — POLYETHYLENE GLYCOL 3350 17 GRAM(S): 17 POWDER, FOR SOLUTION ORAL at 11:07

## 2019-03-13 RX ADMIN — Medication 25 MILLIGRAM(S): at 06:08

## 2019-03-13 RX ADMIN — Medication 25 MICROGRAM(S): at 06:07

## 2019-03-13 RX ADMIN — SODIUM CHLORIDE 80 MILLILITER(S): 9 INJECTION INTRAMUSCULAR; INTRAVENOUS; SUBCUTANEOUS at 06:07

## 2019-03-13 RX ADMIN — Medication 10 MILLIGRAM(S): at 14:37

## 2019-03-13 RX ADMIN — Medication 25 MILLIGRAM(S): at 17:00

## 2019-03-13 RX ADMIN — Medication 100 MILLIGRAM(S): at 06:07

## 2019-03-13 RX ADMIN — CLOPIDOGREL BISULFATE 75 MILLIGRAM(S): 75 TABLET, FILM COATED ORAL at 11:06

## 2019-03-13 NOTE — SWALLOW BEDSIDE ASSESSMENT ADULT - PHARYNGEAL PHASE
Cough post oral intake + toleration observed without overt symptoms of penetration/aspiration Suspected sluggish hyolaryngeal elevation upon palpation. + toleration observed without overt symptoms of penetration/aspiration

## 2019-03-13 NOTE — PHYSICAL THERAPY INITIAL EVALUATION ADULT - GENERAL OBSERVATIONS, REHAB EVAL
10:05-10:28 Chart reviewed. Pt encountered semireclined in bed, may be seen by Physical Therapist as confirmed with Nurse. Patient denied pain at rest and ready to get up now; +IV RUE'; +rae

## 2019-03-13 NOTE — SWALLOW BEDSIDE ASSESSMENT ADULT - ORAL PHASE
Delayed oral transit time Delayed oral transit time/Decreased anterior-posterior movement of the bolus

## 2019-03-13 NOTE — SWALLOW BEDSIDE ASSESSMENT ADULT - SWALLOW EVAL: RECOMMENDED FEEDING/EATING TECHNIQUES
verbal cues to swallow. minimize environmental distractions during intake (e.g., close door, turn off TV)/position upright (90 degrees)/allow for swallow between intakes/small sips/bites

## 2019-03-13 NOTE — PHYSICAL THERAPY INITIAL EVALUATION ADULT - IMPAIRMENTS CONTRIBUTING TO GAIT DEVIATIONS, PT EVAL
impaired postural control/decreased strength/impaired balance/decreased endurance/narrow base of support

## 2019-03-13 NOTE — CONSULT NOTE ADULT - SUBJECTIVE AND OBJECTIVE BOX
HPI:  87 year old female with past medical history of vascular dementia, HTN, CAD s/p PCI, and hypothyroidism BIB daughter for failure to thrive. Patient has deteriorated over the past few weeks. She has decreased PO intake, decreased activity, and worsening of her baseline mental status. She has also been have frequent falls and weakness.   As per daughter pt is eating 2 tbs per meal and fallen x2 over the last wk.   She follows with neurology for dementia.   Denies CP, SOB, palpitations, fevers, chills, dysuria, and abd pain .    PTN  REFERRED TO ACUTE  REHAB  FOR  EVAL AND  TX   PAST MEDICAL & SURGICAL HISTORY:  CAD in native artery  Dementia  Hypothyroid  HTN (hypertension)  No significant past surgical history      Hospital Course:    TODAY'S SUBJECTIVE & REVIEW OF SYMPTOMS:     Constitutional WNL   Cardio WNL   Resp WNL   GI WNL  Heme WNL  Endo WNL  Skin WNL  MSK WNL  Neuro WNL  Cognitive WNL  Psych WNL      MEDICATIONS  (STANDING):  atorvastatin 20 milliGRAM(s) Oral at bedtime  clopidogrel Tablet 75 milliGRAM(s) Oral daily  docusate sodium 100 milliGRAM(s) Oral two times a day  heparin  Injectable 5000 Unit(s) SubCutaneous every 12 hours  levothyroxine 25 MICROGram(s) Oral daily  lisinopril 10 milliGRAM(s) Oral daily  metoprolol tartrate 25 milliGRAM(s) Oral two times a day  polyethylene glycol 3350 17 Gram(s) Oral daily  senna 2 Tablet(s) Oral at bedtime  sodium chloride 0.9%. 1000 milliLiter(s) (80 mL/Hr) IV Continuous <Continuous>    MEDICATIONS  (PRN):  acetaminophen   Tablet .. 650 milliGRAM(s) Oral every 6 hours PRN Temp greater or equal to 38.5C (101.3F), Mild Pain (1 - 3), Moderate Pain (4 - 6)      FAMILY HISTORY:      Allergies    eggs (Unknown)  Fluarix (Unknown)  penicillins (Unknown)    Intolerances        SOCIAL HISTORY:    [  ] Etoh  [  ] Smoking  [  ] Substance abuse     Home Environment:  [  ] Home Alone  [ x ] Lives with Family  [  ] Home Health Aid    Dwelling:  [  ] Apartment  [ x ] Private House  [  ] Adult Home  [  ] Skilled Nursing Facility      [  ] Short Term  [  ] Long Term  [ x ] Stairs       Elevator [  ]    FUNCTIONAL STATUS PTA: (Check all that apply)  Ambulation: [  x ]Independent    [  ] Dependent     [  ] Non-Ambulatory  Assistive Device: [  ] SA Cane  [  ]  Q Cane  [  ] Walker  [  ]  Wheelchair  ADL : [ x ] Independent  [  ]  Dependent       Vital Signs Last 24 Hrs  T(C): 36.3 (13 Mar 2019 05:00), Max: 37.1 (12 Mar 2019 22:05)  T(F): 97.3 (13 Mar 2019 05:00), Max: 98.8 (12 Mar 2019 22:05)  HR: 80 (13 Mar 2019 05:00) (75 - 85)  BP: 185/- (13 Mar 2019 05:00) (137/84 - 227/115)  BP(mean): 87 (13 Mar 2019 05:00) (87 - 87)  RR: 18 (13 Mar 2019 05:00) (18 - 19)  SpO2: 99% (12 Mar 2019 17:29) (97% - 99%)      PHYSICAL EXAM: Alert & Oriented X3  GENERAL: NAD, well-groomed, well-developed  HEAD:  Atraumatic, Normocephalic  EYES: EOMI, PERRLA, conjunctiva and sclera clear  NECK: Supple, No JVD, Normal thyroid  CHEST/LUNG: Clear to percussion bilaterally; No rales, rhonchi, wheezing, or rubs  HEART: Regular rate and rhythm; No murmurs, rubs, or gallops  ABDOMEN: Soft, Nontender, Nondistended; Bowel sounds present  EXTREMITIES:  2+ Peripheral Pulses, No clubbing, cyanosis, or edema    NERVOUS SYSTEM:  Cranial Nerves 2-12 intact [x  ] Abnormal  [  ]  ROM: WFL all extremities [  ]  Abnormal [  x]  Motor Strength: WFL all extremities  [  ]  Abnormal [ 4/5 all ext   ]  Sensation: intact to light touch [x  ] Abnormal [  ]  Reflexes: Symmetric [ x ]  Abnormal [  ]    FUNCTIONAL STATUS:  Bed Mobility: Independent [  ]  Supervision [  ]  Needs Assistance [x  ]  N/A [  ]  Transfers: Independent [  ]  Supervision [  ]  Needs Assistance [ x ]  N/A [  ]   Ambulation: Independent [  ]  Supervision [  ]  Needs Assistance [x  ]  N/A [  ]  ADL: Independent [  ] Requires Assistance [  ] N/A [ x ]  SEE PT/OT IE NOTES    LABS:                        11.2   5.00  )-----------( 147      ( 13 Mar 2019 06:28 )             35.2     03-13    146  |  110  |  23<H>  ----------------------------<  81  3.4<L>   |  24  |  1.1    Ca    8.6      13 Mar 2019 06:28    TPro  5.8<L>  /  Alb  3.5  /  TBili  0.8  /  DBili  x   /  AST  14  /  ALT  8   /  AlkPhos  58  03-13    PT/INR - ( 12 Mar 2019 15:40 )   PT: 12.60 sec;   INR: 1.10 ratio         PTT - ( 12 Mar 2019 15:40 )  PTT:31.7 sec  Urinalysis Basic - ( 12 Mar 2019 15:50 )    Color: Yellow / Appearance: Clear / SG: >=1.030 / pH: x  Gluc: x / Ketone: 15  / Bili: Moderate / Urobili: 1.0 mg/dL   Blood: x / Protein: 100 mg/dL / Nitrite: Negative   Leuk Esterase: Trace / RBC: 1-2 /HPF / WBC 3-5 /HPF   Sq Epi: x / Non Sq Epi: Few /HPF / Bacteria: Few        RADIOLOGY & ADDITIONAL STUDIES:    Assesment:

## 2019-03-13 NOTE — SWALLOW BEDSIDE ASSESSMENT ADULT - SWALLOW EVAL: DIAGNOSIS
Oral phase deficits noted characterized by bolus holding, decreased AP transit, and delayed oral transit time with puree consistencies. Suspected pharyngeal dysphagia with overt signs and symptoms of penetration or aspiration on thin liquids. Pt was highly distractible and with frequent talking while eating, which negatively impacts safety during intake.

## 2019-03-13 NOTE — PROGRESS NOTE ADULT - SUBJECTIVE AND OBJECTIVE BOX
CHIEF COMPLAINT:    Patient is a 87y old  Female who presents with a chief complaint of decreased appetite and weakness over past several weeks.    INTERVAL HPI/OVERNIGHT EVENTS:    Patient seen and examined at bedside. No acute overnight events occurred.    ROS: Denies HA, chest pain, nausea. All other systems are negative.    Vital Signs:    T(F): 97.3 (03-13-19 @ 05:00), Max: 98.8 (03-12-19 @ 22:05)  HR: 80 (03-13-19 @ 05:00) (75 - 85)  BP: 185/- (03-13-19 @ 05:00) (137/84 - 227/115)  RR: 18 (03-13-19 @ 05:00) (18 - 19)  SpO2: 99% (03-12-19 @ 17:29) (97% - 99%)  I&O's Summary    12 Mar 2019 07:01  -  13 Mar 2019 07:00  --------------------------------------------------------  IN: 0 mL / OUT: 600 mL / NET: -600 mL      PHYSICAL EXAM:  GENERAL:  NAD  SKIN: No rashes or lesions  HEENT: Atraumatic. Normocephalic. Anicteric  NECK:  No JVD.   PULMONARY: Clear to ausculation bilaterally. No wheezing. No rales  CVS: Normal S1, S2. Regular rate and rhythm. No murmurs.  ABDOMEN/GI: Soft, Nontender, Nondistended; Bowel sounds are present  EXTREMITIES:  No edema B/L LE.  NEUROLOGIC:  No motor deficit.  PSYCH: Alert & oriented x 1, normal affect    LABS:                        11.2   5.00  )-----------( 147      ( 13 Mar 2019 06:28 )             35.2     03-13    146  |  110  |  23<H>  ----------------------------<  81  3.4<L>   |  24  |  1.1    Ca    8.6      13 Mar 2019 06:28    TPro  5.8<L>  /  Alb  3.5  /  TBili  0.8  /  DBili  x   /  AST  14  /  ALT  8   /  AlkPhos  58  03-13    PT/INR - ( 12 Mar 2019 15:40 )   PT: 12.60 sec;   INR: 1.10 ratio         PTT - ( 12 Mar 2019 15:40 )  PTT:31.7 sec    Trop <0.01, CKMB --, CK --, 03-12-19 @ 15:40      RADIOLOGY & ADDITIONAL TESTS:  No new imaging  No tele events (independently reviewed)    Medications:  Standing  atorvastatin 20 milliGRAM(s) Oral at bedtime  clopidogrel Tablet 75 milliGRAM(s) Oral daily  docusate sodium 100 milliGRAM(s) Oral two times a day  heparin  Injectable 5000 Unit(s) SubCutaneous every 12 hours  levothyroxine 25 MICROGram(s) Oral daily  lisinopril 10 milliGRAM(s) Oral daily  metoprolol tartrate 25 milliGRAM(s) Oral two times a day  polyethylene glycol 3350 17 Gram(s) Oral daily  senna 2 Tablet(s) Oral at bedtime  sodium chloride 0.9%. 1000 milliLiter(s) IV Continuous <Continuous>    PRN Meds  acetaminophen   Tablet .. 650 milliGRAM(s) Oral every 6 hours PRN

## 2019-03-13 NOTE — PROGRESS NOTE ADULT - ASSESSMENT
87 year old female with past medical history of vascular dementia, HTN, CAD s/p PCI, and hypothyroidism was brought in by her daughter for evaluation of deterioration over the past few weeks. Pt has decreased PO intake, decreased activity, and worsening of her baseline mental status. She has also been have frequent falls and weakness.  As per daughter pt is eating 2 tbs per meal and fallen x2 over the last wk.      Failure to thrive  -likely due to advancing chronic vascular dementia  -geriatric consult pending  -check TSH    HTN  -uncontrolled  -c/w lisinopril  -will likely need amlodipine added on    Constipation  c/w senna, PEG, colace    Hypokalemia  -repleted  -f/u AM labs    CAD s/p PCI  -c/w lipitor, plavix, metoprolol    History of CVA  -c/w lipitor, plavix    Hypothyroidism   -c/w synthroid    CKDIII   -stable    Chronic Anemia   -stable    AAA - 4.2 cm   -monitor as outpatient    Progress Note Handoff:  Pending (specify):  Consults____Geri_____, Tests________, Test Results_______, Other_________  Family discussion: pending for today with daughter  Disposition: Home___/SNF_x__/Other________/Unknown at this time________

## 2019-03-13 NOTE — SWALLOW BEDSIDE ASSESSMENT ADULT - SLP PERTINENT HISTORY OF CURRENT PROBLEM
88 y/o female BIB family due to poor PO intake and failure to thrive. Family reports pt is holding and spitting out food

## 2019-03-13 NOTE — SWALLOW BEDSIDE ASSESSMENT ADULT - SWALLOW EVAL: RECOMMENDED DIET
Dysphagia diet I puree consistency and nectar thick liquids. Reduce environmental distractions at mealtime. 1:1 feeding

## 2019-03-13 NOTE — SWALLOW BEDSIDE ASSESSMENT ADULT - SLP GENERAL OBSERVATIONS
Pt seen awake and alert in bed. Family (, daughter, granddaughter) present at bedside. Pt is AOx1 (self only). Pleasantly confused.

## 2019-03-13 NOTE — PHYSICAL THERAPY INITIAL EVALUATION ADULT - IMPAIRED TRANSFERS: SIT/STAND, REHAB EVAL
narrow base of support/decreased endurance/impaired balance/decreased strength/impaired postural control

## 2019-03-13 NOTE — SWALLOW BEDSIDE ASSESSMENT ADULT - ASR SWALLOW DENTITION
edentulous, does not have dentures/family reports pt has dentures at home but does not wear them anymore.

## 2019-03-13 NOTE — CONSULT NOTE ADULT - ASSESSMENT
IMPRESSION: Rehab of 88 y/o  f  rehab  for  debility gd      PRECAUTIONS: [  ] Cardiac  [  ] Respiratory  [  ] Seizures [  ] Contact Isolation  [  ] Droplet Isolation  [ FALL ] Other    Weight Bearing Status:     RECOMMENDATION:    Out of Bed to Chair     DVT/Decubiti Prophylaxis    REHAB PLAN:     [   xx] Bedside P/T 3-5 times a week   [   ]   Bedside O/T  2-3 times a week             [   ] No Rehab Therapy Indicated                   [   ]  Speech Therapy   Conditioning/ROM                                    ADL  Bed Mobility                                               Conditioning/ROM  Transfers                                                     Bed Mobility  Sitting /Standing Balance                         Transfers                                        Gait Training                                               Sitting/Standing Balance  Stair Training [   ]Applicable                    Home equipment Eval                                                                        Splinting  [   ] Only      GOALS:   ADL   [  x ]   Independent                    Transfers  [  x ] Independent                          Ambulation  [x   ] Independent     [   x ] With device                            [   ]  CG                                                         [   ]  CG                                                                  [   ] CG                            [    ] Min A                                                   [   ] Min A                                                              [   ] Min  A          DISCHARGE PLAN:   [   ]  Good candidate for Intensive Rehabilitation/Hospital based-[facility]A [facility]                                             Will tolerate 3hrs Intensive Rehab Daily                                       [ xx ]  Short Term Rehab in Skilled Nursing Facility may  need  24 hrs  vs  LTC                                       [    ]  Home with Outpatient or VN services                                         [    ]  Possible Candidate for Intensive Hospital based Rehab

## 2019-03-13 NOTE — PHYSICAL THERAPY INITIAL EVALUATION ADULT - GAIT DEVIATIONS NOTED, PT EVAL
stooped posture, dec heel strike/pushoff /stance jarvis LLE/decreased nydia/decreased step length/decreased weight-shifting ability

## 2019-03-13 NOTE — SWALLOW BEDSIDE ASSESSMENT ADULT - COMMENTS
Family reports pt has had ~15lb weight loss x1 month. (+) talking while eating. easily distracted. pt benefited from reduced environmental distraction and verbal cues to swallow.

## 2019-03-13 NOTE — SWALLOW BEDSIDE ASSESSMENT ADULT - ASR SWALLOW ASPIRATION MONITOR
cough/change of breathing pattern/position upright (90Y)/oral hygiene/gurgly voice/fever/pneumonia/throat clearing/upper respiratory infection

## 2019-03-14 LAB
ANION GAP SERPL CALC-SCNC: 15 MMOL/L — HIGH (ref 7–14)
BUN SERPL-MCNC: 19 MG/DL — SIGNIFICANT CHANGE UP (ref 10–20)
CALCIUM SERPL-MCNC: 9.3 MG/DL — SIGNIFICANT CHANGE UP (ref 8.5–10.1)
CHLORIDE SERPL-SCNC: 110 MMOL/L — SIGNIFICANT CHANGE UP (ref 98–110)
CO2 SERPL-SCNC: 22 MMOL/L — SIGNIFICANT CHANGE UP (ref 17–32)
CREAT SERPL-MCNC: 1.1 MG/DL — SIGNIFICANT CHANGE UP (ref 0.7–1.5)
GLUCOSE SERPL-MCNC: 98 MG/DL — SIGNIFICANT CHANGE UP (ref 70–99)
MAGNESIUM SERPL-MCNC: 1.8 MG/DL — SIGNIFICANT CHANGE UP (ref 1.8–2.4)
POTASSIUM SERPL-MCNC: 3.8 MMOL/L — SIGNIFICANT CHANGE UP (ref 3.5–5)
POTASSIUM SERPL-SCNC: 3.8 MMOL/L — SIGNIFICANT CHANGE UP (ref 3.5–5)
SODIUM SERPL-SCNC: 147 MMOL/L — HIGH (ref 135–146)
TSH SERPL-MCNC: 5.94 UIU/ML — HIGH (ref 0.27–4.2)

## 2019-03-14 RX ORDER — LABETALOL HCL 100 MG
10 TABLET ORAL ONCE
Qty: 0 | Refills: 0 | Status: COMPLETED | OUTPATIENT
Start: 2019-03-14 | End: 2019-03-14

## 2019-03-14 RX ORDER — SODIUM CHLORIDE 9 MG/ML
1000 INJECTION, SOLUTION INTRAVENOUS
Qty: 0 | Refills: 0 | Status: DISCONTINUED | OUTPATIENT
Start: 2019-03-14 | End: 2019-03-15

## 2019-03-14 RX ADMIN — Medication 1 INCH(S): at 00:03

## 2019-03-14 RX ADMIN — HEPARIN SODIUM 5000 UNIT(S): 5000 INJECTION INTRAVENOUS; SUBCUTANEOUS at 05:58

## 2019-03-14 RX ADMIN — Medication 1 INCH(S): at 13:09

## 2019-03-14 RX ADMIN — HEPARIN SODIUM 5000 UNIT(S): 5000 INJECTION INTRAVENOUS; SUBCUTANEOUS at 17:19

## 2019-03-14 RX ADMIN — SODIUM CHLORIDE 75 MILLILITER(S): 9 INJECTION, SOLUTION INTRAVENOUS at 10:08

## 2019-03-14 RX ADMIN — Medication 10 MILLIGRAM(S): at 14:37

## 2019-03-14 RX ADMIN — ATORVASTATIN CALCIUM 20 MILLIGRAM(S): 80 TABLET, FILM COATED ORAL at 21:18

## 2019-03-14 RX ADMIN — SENNA PLUS 2 TABLET(S): 8.6 TABLET ORAL at 21:18

## 2019-03-14 RX ADMIN — Medication 25 MILLIGRAM(S): at 17:19

## 2019-03-14 RX ADMIN — CLOPIDOGREL BISULFATE 75 MILLIGRAM(S): 75 TABLET, FILM COATED ORAL at 12:55

## 2019-03-14 NOTE — CHART NOTE - NSCHARTNOTEFT_GEN_A_CORE
Notifed earlier pt's . Improved to 180 now but pt agitated and screaming while taking BP. Will hold off prescribing additional antihypertensives to prevent cerebral under perfusion and BP likely elevated due to agitation. If it remains persistently over 180 will order additional agent.

## 2019-03-14 NOTE — PROGRESS NOTE ADULT - SUBJECTIVE AND OBJECTIVE BOX
CHIEF COMPLAINT:    Patient is a 87y old  Female who presents with a chief complaint of decreased appetite and weakness over past several weeks.     INTERVAL HPI/OVERNIGHT EVENTS:    Patient seen and examined at bedside. No acute overnight events occurred.    ROS: Denies headache, abdominal pain. All other systems are negative.    Vital Signs:    T(F): 96.2 (03-14-19 @ 05:51), Max: 97.5 (03-13-19 @ 13:36)  HR: 86 (03-14-19 @ 05:51) (75 - 105)  BP: 198/105 (03-14-19 @ 05:51) (142/100 - 220/95)  RR: 16 (03-14-19 @ 05:51) (16 - 20)  SpO2: 98% (03-13-19 @ 13:39) (98% - 98%)  I&O's Summary    13 Mar 2019 07:01  -  14 Mar 2019 07:00  --------------------------------------------------------  IN: 0 mL / OUT: 652 mL / NET: -652 mL      PHYSICAL EXAM:  GENERAL:  NAD  SKIN: No rashes or lesions  HEENT: Atraumatic. Normocephalic. Anicteric  NECK:  No JVD.   PULMONARY: Clear to ausculation bilaterally. No wheezing. No rales  CVS: Normal S1, S2. Regular rate and rhythm. No murmurs.  ABDOMEN/GI: Soft, Nontender, Nondistended; Bowel sounds are present  EXTREMITIES:  No edema B/L LE.  NEUROLOGIC:  No motor deficit.  PSYCH: Alert & oriented x 1, normal affect      LABS:                        11.2   5.00  )-----------( 147      ( 13 Mar 2019 06:28 )             35.2     03-14    147<H>  |  110  |  19  ----------------------------<  98  3.8   |  22  |  1.1    Ca    9.3      14 Mar 2019 08:45  Mg     1.8     03-14    TPro  5.8<L>  /  Alb  3.5  /  TBili  0.8  /  DBili  x   /  AST  14  /  ALT  8   /  AlkPhos  58  03-13    PT/INR - ( 12 Mar 2019 15:40 )   PT: 12.60 sec;   INR: 1.10 ratio         PTT - ( 12 Mar 2019 15:40 )  PTT:31.7 sec    Trop <0.01, CKMB --, CK --, 03-12-19 @ 15:40        RADIOLOGY & ADDITIONAL TESTS:  no new images    Medications:  Standing  atorvastatin 20 milliGRAM(s) Oral at bedtime  clopidogrel Tablet 75 milliGRAM(s) Oral daily  dextrose 5% + sodium chloride 0.45%. 1000 milliLiter(s) IV Continuous <Continuous>  docusate sodium 100 milliGRAM(s) Oral two times a day  heparin  Injectable 5000 Unit(s) SubCutaneous every 12 hours  levothyroxine 25 MICROGram(s) Oral daily  lisinopril 10 milliGRAM(s) Oral daily  metoprolol tartrate 25 milliGRAM(s) Oral two times a day  polyethylene glycol 3350 17 Gram(s) Oral daily  senna 2 Tablet(s) Oral at bedtime    PRN Meds  acetaminophen   Tablet .. 650 milliGRAM(s) Oral every 6 hours PRN

## 2019-03-14 NOTE — PROGRESS NOTE ADULT - ASSESSMENT
87 year old female with past medical history of vascular dementia, HTN, CAD s/p PCI, and hypothyroidism was brought in by her daughter for evaluation of deterioration over the past few weeks. Pt has decreased PO intake, decreased activity, and worsening of her baseline mental status. She has also been have frequent falls and weakness.  As per daughter pt is eating 2 tbs per meal and fallen x2 over the last wk.      Failure to thrive  -likely due to advancing chronic vascular dementia  -geriatric consult pending  -TSH pending    HTN  -uncontrolled  -c/w lisinopril  -will add amlodipine    Hypernatremia  -likely due to decreased PO intake  -1/2NS started    Constipation  c/w senna, PEG, colace    Hypokalemia  -resolved    CAD s/p PCI  -c/w lipitor, plavix, metoprolol    History of CVA  -c/w lipitor, plavix    Hypothyroidism   -c/w synthroid    CKDIII   -stable    Chronic Anemia   -stable    AAA - 4.2 cm   -monitor as outpatient    Progress Note Handoff:  Pending (specify):  Consults____Geri_____, Tests________, Test Results_______, Other_________  Family discussion: pending for today with daughter  Disposition: Home___/SNF_x__/Other________/Unknown at this time________ 87 year old female with past medical history of vascular dementia, HTN, CAD s/p PCI, and hypothyroidism was brought in by her daughter for evaluation of deterioration over the past few weeks. Pt has decreased PO intake, decreased activity, and worsening of her baseline mental status. She has also been have frequent falls and weakness.  As per daughter pt is eating 2 tbs per meal and fallen x2 over the last wk.      Failure to thrive  -likely due to advancing chronic vascular dementia  -geriatric consult pending  -TSH pending    HTN  -uncontrolled  -c/w lisinopril  -will add amlodipine    Hypernatremia  -likely due to decreased PO intake  -1/2NS started    Constipation  c/w senna, PEG, colace    Hypokalemia  -resolved    CAD s/p PCI  -c/w lipitor, plavix, metoprolol    History of CVA  -c/w lipitor, plavix    Hypothyroidism   -c/w synthroid  -TSH pending    CKDIII   -stable    Chronic Anemia   -stable    AAA - 4.2 cm   -monitor as outpatient    Progress Note Handoff:  Pending (specify):  Consults____Geri_____, Tests________, Test Results_______, Other_________  Family discussion: Held with daughter today. We discussed how vascular dementia progresses in a step wise fashion. Explained that since pt stopped taking her synthroid for many months this could also affect behavior-knows we're waiting for TSH. If normal will d/c to SNF tomorrow with neuro follow up.  Disposition: Home___/SNF_x__/Other________/Unknown at this time________

## 2019-03-15 ENCOUNTER — TRANSCRIPTION ENCOUNTER (OUTPATIENT)
Age: 84
End: 2019-03-15

## 2019-03-15 VITALS
DIASTOLIC BLOOD PRESSURE: 94 MMHG | HEART RATE: 98 BPM | RESPIRATION RATE: 20 BRPM | SYSTOLIC BLOOD PRESSURE: 153 MMHG | TEMPERATURE: 97 F

## 2019-03-15 LAB
ANION GAP SERPL CALC-SCNC: 11 MMOL/L — SIGNIFICANT CHANGE UP (ref 7–14)
BUN SERPL-MCNC: 14 MG/DL — SIGNIFICANT CHANGE UP (ref 10–20)
CALCIUM SERPL-MCNC: 8.8 MG/DL — SIGNIFICANT CHANGE UP (ref 8.5–10.1)
CHLORIDE SERPL-SCNC: 108 MMOL/L — SIGNIFICANT CHANGE UP (ref 98–110)
CO2 SERPL-SCNC: 24 MMOL/L — SIGNIFICANT CHANGE UP (ref 17–32)
CREAT SERPL-MCNC: 1.1 MG/DL — SIGNIFICANT CHANGE UP (ref 0.7–1.5)
GLUCOSE SERPL-MCNC: 127 MG/DL — HIGH (ref 70–99)
POTASSIUM SERPL-MCNC: 3.2 MMOL/L — LOW (ref 3.5–5)
POTASSIUM SERPL-SCNC: 3.2 MMOL/L — LOW (ref 3.5–5)
SODIUM SERPL-SCNC: 143 MMOL/L — SIGNIFICANT CHANGE UP (ref 135–146)

## 2019-03-15 RX ADMIN — CLOPIDOGREL BISULFATE 75 MILLIGRAM(S): 75 TABLET, FILM COATED ORAL at 11:20

## 2019-03-15 RX ADMIN — POLYETHYLENE GLYCOL 3350 17 GRAM(S): 17 POWDER, FOR SOLUTION ORAL at 11:20

## 2019-03-15 RX ADMIN — HEPARIN SODIUM 5000 UNIT(S): 5000 INJECTION INTRAVENOUS; SUBCUTANEOUS at 05:37

## 2019-03-15 RX ADMIN — SODIUM CHLORIDE 75 MILLILITER(S): 9 INJECTION, SOLUTION INTRAVENOUS at 08:34

## 2019-03-15 NOTE — DISCHARGE NOTE NURSING/CASE MANAGEMENT/SOCIAL WORK - NSTRANSFERDENTURES_GEN_A_NUR
lower/full/upper/not in mouth pt refuses , sent to  [phone] w/pt on room change, denture container labeled in property bag

## 2019-03-15 NOTE — DIETITIAN INITIAL EVALUATION ADULT. - OTHER INFO
Reason for assessment: Consult for decreased PO intake and unintentional wt loss. Unable to confirm unintentional wt loss. No past admits w/ wt documented. PMH: HTN, CAD s/p PCI, hypothyroidism. Pt presented to ED for deterioration, decreased PO intake (FTT), decreased activity, worsening mental status, and falls. HTN, hypernatremia (now WNL), CKD lll (stable) and chronic anemia noted. Pt was seen by SLP on 3/13 nad they recommended a dysphagia l/nectar thick diet. Unsure of last BM. PCA reports that she did not have one this morning. Constipation noted (BM regimen order). Fecal incontinence. Abdomen noted as soft, nontender,  nondistended + BS. Pt is allergic to eggs. Reason for assessment: Consult for decreased PO intake and unintentional wt loss. Unable to confirm unintentional wt loss. No past admits w/ wt documented. PMH: HTN, CAD s/p PCI, hypothyroidism. Pt presented to ED for deterioration, decreased PO intake (FTT), decreased activity, worsening mental status, and falls. HTN, hypernatremia (now WNL), CKD lll (stable) and chronic anemia noted. Pt is most likely being discharged today. Pt was seen by SLP on 3/13 nad they recommended a dysphagia l/nectar thick diet. Unsure of last BM. PCA reports that she did not have one this morning. Constipation noted (BM regimen order). Fecal incontinence. Abdomen noted as soft, nontender,  nondistended + BS. Pt is allergic to eggs.

## 2019-03-15 NOTE — DISCHARGE NOTE NURSING/CASE MANAGEMENT/SOCIAL WORK - NSDCDPATPORTLINK_GEN_ALL_CORE
You can access the [facility] Portal, offered by [facility], by registering with the following website: [email or web address]

## 2019-03-15 NOTE — DIETITIAN INITIAL EVALUATION ADULT. - INDICATOR
Will monitor intake, nutrition focused physical findings, and labs. At risk. Will monitor intake, nutrition focused physical findings, electrolyte, renal, glucose profile

## 2019-03-15 NOTE — DIETITIAN INITIAL EVALUATION ADULT. - ENERGY NEEDS
Calories: 5637-4142 (MSJ A.F 1.2-1.3)   Protein: 59-71g/day (1-1.2/kg) Monitor renal labs   Fluid: 1:1ml/kcal

## 2019-03-15 NOTE — PROGRESS NOTE ADULT - SUBJECTIVE AND OBJECTIVE BOX
Pt seen and examined at bedside. Medically stable for discharge. Discussed with patient's daughter Bc who is aware and agreeable to d/c to [facility]    PHYSICAL EXAM:  GENERAL: NAD, speaks in full sentences, no signs of respiratory distress  HEAD:  Atraumatic, Normocephalic  EYES: Anicteric  NECK: Supple, No JVD  CHEST/LUNG: Clear to auscultation bilaterally; No wheeze; No crackles; No accessory muscles used  HEART: Regular rate and rhythm; No murmurs;   ABDOMEN: Soft, Nontender, Nondistended; Bowel sounds present; No guarding  EXTREMITIES:  2+ Peripheral Pulses, No cyanosis or edema  PSYCH: AAOx1  NEUROLOGY: non-focal  SKIN: No rashes or lesions Pt seen and examined at bedside. Medically stable for discharge. Discussed with patient's daughter Bc who is aware and agreeable to d/c to [facility]    PHYSICAL EXAM:  GENERAL: NAD, speaks in full sentences, no signs of respiratory distress  HEAD:  Atraumatic, Normocephalic  EYES: Anicteric  NECK: Supple, No JVD  CHEST/LUNG: Clear to auscultation bilaterally; No wheeze; No crackles; No accessory muscles used  HEART: Regular rate and rhythm; No murmurs;   ABDOMEN: Soft, Nontender, Nondistended; Bowel sounds present; No guarding  EXTREMITIES:  2+ Peripheral Pulses, No cyanosis or edema  PSYCH: AAOx1  NEUROLOGY: non-focal  SKIN: No rashes or lesions    ICU Vital Signs Last 24 Hrs  T(C): 36.2 (15 Mar 2019 04:59), Max: 36.7 (14 Mar 2019 14:33)  T(F): 97.2 (15 Mar 2019 04:59), Max: 98 (14 Mar 2019 14:33)  HR: 98 (15 Mar 2019 04:59) (84 - 101)  BP: 153/94 (15 Mar 2019 04:59) (121/92 - 197/98)  BP(mean): --  ABP: --  ABP(mean): --  RR: 20 (15 Mar 2019 04:59) (20 - 24)  SpO2: 98% (14 Mar 2019 10:35) (98% - 98%)      time spent coordinating discharge 37 minutes

## 2019-03-15 NOTE — DISCHARGE NOTE PROVIDER - NSDCCPCAREPLAN_GEN_ALL_CORE_FT
PRINCIPAL DISCHARGE DIAGNOSIS  Diagnosis: Advancing dementia  Assessment and Plan of Treatment:       SECONDARY DISCHARGE DIAGNOSES  Diagnosis: Decreased ambulation status  Assessment and Plan of Treatment:

## 2019-03-15 NOTE — DIETITIAN INITIAL EVALUATION ADULT. - NUTRITIONGOAL OUTCOME1
Consume >50% of meals and supplements provided for 4 days Consume >50% of meals and supplements provided for 4 days. At risk

## 2019-03-15 NOTE — PROGRESS NOTE ADULT - REASON FOR ADMISSION
Failure to thrive
Weakness, fall, worsening dementia
decreased appetite and weakness over past several weeks.

## 2019-03-15 NOTE — DIETITIAN INITIAL EVALUATION ADULT. - NS AS NUTRI INTERV MEALS SNACK
continue on a puree/nectar thick diet and advance as per SLP continue on a puree/nectar thick diet and advance as per SLP. May need to consider alternate means of nutrition.

## 2019-03-15 NOTE — DISCHARGE NOTE PROVIDER - HOSPITAL COURSE
87 year old female with past medical history of vascular dementia, HTN, CAD s/p PCI, and hypothyroidism was brought in by her daughter for evaluation of deterioration over the past few weeks. Pt has decreased PO intake, decreased activity, and worsening of her baseline mental status. She has also been have frequent falls and weakness.  As per daughter pt is eating 2 tbs per meal and fallen x2 over the last wk.  TSH was normal. Blood pressure was uncontrolled but improved with addition of amlopidine and labetolol. Hypernatremia was found but improved with IV fluids. She has CKD III which is stable at baseline and a 4.2 cm AAA which is to be followed in the community. Her symptoms appear to be related to advancing vascular dementia and will required continued neurology follow ups.

## 2019-03-15 NOTE — DIETITIAN INITIAL EVALUATION ADULT. - FACTORS AFF FOOD INTAKE
Poor PO is most likely due to mental status. Pt is a total feed. EMR reports 20% intake. PCA reports that when she tried to feed her this morning she kept spitting out the food.

## 2019-03-18 ENCOUNTER — OUTPATIENT (OUTPATIENT)
Dept: OUTPATIENT SERVICES | Facility: HOSPITAL | Age: 84
LOS: 1 days | Discharge: HOME | End: 2019-03-18

## 2019-03-18 DIAGNOSIS — I73.9 PERIPHERAL VASCULAR DISEASE, UNSPECIFIED: ICD-10-CM

## 2019-03-18 DIAGNOSIS — F01.51 VASCULAR DEMENTIA, UNSPECIFIED SEVERITY, WITH BEHAVIORAL DISTURBANCE: ICD-10-CM

## 2019-03-18 DIAGNOSIS — F03.90 UNSPECIFIED DEMENTIA WITHOUT BEHAVIORAL DISTURBANCE: ICD-10-CM

## 2019-03-18 DIAGNOSIS — R32 UNSPECIFIED URINARY INCONTINENCE: ICD-10-CM

## 2019-03-18 DIAGNOSIS — I10 ESSENTIAL (PRIMARY) HYPERTENSION: ICD-10-CM

## 2019-03-18 DIAGNOSIS — R53.1 WEAKNESS: ICD-10-CM

## 2019-03-18 DIAGNOSIS — E78.5 HYPERLIPIDEMIA, UNSPECIFIED: ICD-10-CM

## 2019-03-18 DIAGNOSIS — R26.0 ATAXIC GAIT: ICD-10-CM

## 2019-03-18 DIAGNOSIS — R45.1 RESTLESSNESS AND AGITATION: ICD-10-CM

## 2019-03-18 PROBLEM — I25.10 ATHEROSCLEROTIC HEART DISEASE OF NATIVE CORONARY ARTERY WITHOUT ANGINA PECTORIS: Chronic | Status: ACTIVE | Noted: 2019-03-12

## 2019-03-20 ENCOUNTER — OUTPATIENT (OUTPATIENT)
Dept: OUTPATIENT SERVICES | Facility: HOSPITAL | Age: 84
LOS: 1 days | Discharge: HOME | End: 2019-03-20

## 2019-03-20 DIAGNOSIS — E87.0 HYPEROSMOLALITY AND HYPERNATREMIA: ICD-10-CM

## 2019-03-21 DIAGNOSIS — I25.10 ATHEROSCLEROTIC HEART DISEASE OF NATIVE CORONARY ARTERY WITHOUT ANGINA PECTORIS: ICD-10-CM

## 2019-03-21 DIAGNOSIS — R62.7 ADULT FAILURE TO THRIVE: ICD-10-CM

## 2019-03-21 DIAGNOSIS — R26.2 DIFFICULTY IN WALKING, NOT ELSEWHERE CLASSIFIED: ICD-10-CM

## 2019-03-21 DIAGNOSIS — E03.9 HYPOTHYROIDISM, UNSPECIFIED: ICD-10-CM

## 2019-03-21 DIAGNOSIS — E87.0 HYPEROSMOLALITY AND HYPERNATREMIA: ICD-10-CM

## 2019-03-21 DIAGNOSIS — I71.4 ABDOMINAL AORTIC ANEURYSM, WITHOUT RUPTURE: ICD-10-CM

## 2019-03-21 DIAGNOSIS — Z88.0 ALLERGY STATUS TO PENICILLIN: ICD-10-CM

## 2019-03-21 DIAGNOSIS — Z95.5 PRESENCE OF CORONARY ANGIOPLASTY IMPLANT AND GRAFT: ICD-10-CM

## 2019-03-21 DIAGNOSIS — Z91.012 ALLERGY TO EGGS: ICD-10-CM

## 2019-03-21 DIAGNOSIS — E86.0 DEHYDRATION: ICD-10-CM

## 2019-03-21 DIAGNOSIS — D64.9 ANEMIA, UNSPECIFIED: ICD-10-CM

## 2019-03-21 DIAGNOSIS — E87.6 HYPOKALEMIA: ICD-10-CM

## 2019-03-21 DIAGNOSIS — Z91.81 HISTORY OF FALLING: ICD-10-CM

## 2019-03-21 DIAGNOSIS — I12.9 HYPERTENSIVE CHRONIC KIDNEY DISEASE WITH STAGE 1 THROUGH STAGE 4 CHRONIC KIDNEY DISEASE, OR UNSPECIFIED CHRONIC KIDNEY DISEASE: ICD-10-CM

## 2019-03-21 DIAGNOSIS — F01.50 VASCULAR DEMENTIA WITHOUT BEHAVIORAL DISTURBANCE: ICD-10-CM

## 2019-03-21 DIAGNOSIS — K59.00 CONSTIPATION, UNSPECIFIED: ICD-10-CM

## 2019-03-21 DIAGNOSIS — N18.3 CHRONIC KIDNEY DISEASE, STAGE 3 (MODERATE): ICD-10-CM

## 2019-03-25 ENCOUNTER — OUTPATIENT (OUTPATIENT)
Dept: OUTPATIENT SERVICES | Facility: HOSPITAL | Age: 84
LOS: 1 days | Discharge: HOME | End: 2019-03-25

## 2019-03-25 DIAGNOSIS — E87.0 HYPEROSMOLALITY AND HYPERNATREMIA: ICD-10-CM

## 2019-03-25 DIAGNOSIS — E87.6 HYPOKALEMIA: ICD-10-CM

## 2019-03-25 DIAGNOSIS — R53.1 WEAKNESS: ICD-10-CM

## 2019-03-26 ENCOUNTER — OUTPATIENT (OUTPATIENT)
Dept: OUTPATIENT SERVICES | Facility: HOSPITAL | Age: 84
LOS: 1 days | Discharge: HOME | End: 2019-03-26

## 2019-03-26 DIAGNOSIS — E87.6 HYPOKALEMIA: ICD-10-CM

## 2019-03-27 ENCOUNTER — TRANSCRIPTION ENCOUNTER (OUTPATIENT)
Age: 84
End: 2019-03-27

## 2019-03-27 ENCOUNTER — OUTPATIENT (OUTPATIENT)
Dept: OUTPATIENT SERVICES | Facility: HOSPITAL | Age: 84
LOS: 1 days | Discharge: HOME | End: 2019-03-27

## 2019-03-27 VITALS
RESPIRATION RATE: 18 BRPM | HEIGHT: 62 IN | SYSTOLIC BLOOD PRESSURE: 167 MMHG | HEART RATE: 105 BPM | TEMPERATURE: 100 F | WEIGHT: 134.92 LBS | DIASTOLIC BLOOD PRESSURE: 93 MMHG

## 2019-03-27 VITALS — RESPIRATION RATE: 18 BRPM | SYSTOLIC BLOOD PRESSURE: 124 MMHG | DIASTOLIC BLOOD PRESSURE: 82 MMHG | HEART RATE: 105 BPM

## 2019-03-27 DIAGNOSIS — E87.6 HYPOKALEMIA: ICD-10-CM

## 2019-03-27 RX ORDER — DONEPEZIL HYDROCHLORIDE 10 MG/1
1 TABLET, FILM COATED ORAL
Qty: 0 | Refills: 0 | COMMUNITY

## 2019-03-27 RX ORDER — POTASSIUM CHLORIDE 20 MEQ
1 PACKET (EA) ORAL
Qty: 0 | Refills: 0 | COMMUNITY

## 2019-03-27 RX ORDER — LISINOPRIL 2.5 MG/1
1 TABLET ORAL
Qty: 0 | Refills: 0 | COMMUNITY

## 2019-03-27 RX ORDER — AMLODIPINE BESYLATE 2.5 MG/1
1 TABLET ORAL
Qty: 0 | Refills: 0 | COMMUNITY

## 2019-03-27 RX ORDER — CEFAZOLIN SODIUM 1 G
1000 VIAL (EA) INJECTION ONCE
Qty: 0 | Refills: 0 | Status: DISCONTINUED | OUTPATIENT
Start: 2019-03-27 | End: 2019-03-27

## 2019-03-27 RX ORDER — METOPROLOL TARTRATE 50 MG
1 TABLET ORAL
Qty: 0 | Refills: 0 | COMMUNITY

## 2019-03-27 RX ORDER — CLOPIDOGREL BISULFATE 75 MG/1
1 TABLET, FILM COATED ORAL
Qty: 0 | Refills: 0 | COMMUNITY

## 2019-03-27 RX ORDER — LEVOTHYROXINE SODIUM 125 MCG
1 TABLET ORAL
Qty: 0 | Refills: 0 | COMMUNITY

## 2019-03-27 RX ORDER — ATORVASTATIN CALCIUM 80 MG/1
1 TABLET, FILM COATED ORAL
Qty: 0 | Refills: 0 | COMMUNITY

## 2019-03-27 RX ORDER — SODIUM CHLORIDE 9 MG/ML
1000 INJECTION, SOLUTION INTRAVENOUS ONCE
Qty: 0 | Refills: 0 | Status: DISCONTINUED | OUTPATIENT
Start: 2019-03-27 | End: 2019-04-11

## 2019-03-27 NOTE — PRE-ANESTHESIA EVALUATION ADULT - NSANTHPEFT_GEN_ALL_CORE
Pale, ill-appearing elderly WF in NAD.  Not oriented.  Chest:  BLBS, ronchi present  Cor:  Reg. S1S2 no murmur

## 2019-03-27 NOTE — H&P ADULT - NSHPPHYSICALEXAM_GEN_ALL_CORE
PHYSICAL EXAM:   Vital Signs:  Vital Signs Last 24 Hrs  T(C): 37.6 (27 Mar 2019 08:09), Max: 37.6 (27 Mar 2019 07:02)  T(F): 99.7 (27 Mar 2019 07:02), Max: 99.7 (27 Mar 2019 07:02)  HR: 105 (27 Mar 2019 08:09) (105 - 105)  BP: 167/93 (27 Mar 2019 08:09) (167/93 - 167/93)  BP(mean): --  RR: 18 (27 Mar 2019 08:09) (18 - 18)  SpO2: --  Daily Height in cm: 157.48 (27 Mar 2019 08:09)    Daily     GENERAL:  Appears stated age, well-groomed, well-nourished, no distress  HEENT:  NC/AT,  conjunctivae clear and pink, no thyromegaly, nodules, adenopathy, no JVD, sclera -anicteric  CHEST:  Full & symmetric excursion, no increased effort, breath sounds clear  HEART:  Regular rhythm, S1, S2, no murmur/rub/S3/S4, no abdominal bruit, no edema  ABDOMEN:  Soft, non-tender, non-distended, normoactive bowel sounds,  no masses ,no hepato-splenomegaly, no signs of chronic liver disease  EXTEREMITIES:  no cyanosis,clubbing or edema  SKIN:  No rash/erythema/ecchymoses/petechiae/wounds/abscess/warm/dry  NEURO:  Alert, oriented, no asterixis, no tremor, no encephalopathy

## 2019-03-27 NOTE — PRE-ANESTHESIA EVALUATION ADULT - NSANTHOSAYNRD_GEN_A_CORE
No. BRAULIO screening performed.  STOP BANG Legend: 0-2 = LOW Risk; 3-4 = INTERMEDIATE Risk; 5-8 = HIGH Risk

## 2019-03-27 NOTE — ASU DISCHARGE PLAN (ADULT/PEDIATRIC) - NURSING INSTRUCTIONS
NPO. Nothing Via G tube except h20 flushes and meds. Flush g tube with 100 cc h20 qshift and after meds.  iv .45 cc ns at 88 cc/hr  clina 300mg at 4p and midnight today via gt  cover gt site with abdo binder at all times  in am if abdo exam negative begin gtube feeds and d/c ivf  may be d/marilee to [facility] with heplock  ivf d5 .45ns at 8cc/hr ,reval in am

## 2019-04-02 DIAGNOSIS — R13.10 DYSPHAGIA, UNSPECIFIED: ICD-10-CM

## 2019-04-02 DIAGNOSIS — K31.89 OTHER DISEASES OF STOMACH AND DUODENUM: ICD-10-CM

## 2019-04-02 DIAGNOSIS — R62.7 ADULT FAILURE TO THRIVE: ICD-10-CM

## 2019-04-02 DIAGNOSIS — Z91.012 ALLERGY TO EGGS: ICD-10-CM

## 2019-04-02 DIAGNOSIS — Z88.0 ALLERGY STATUS TO PENICILLIN: ICD-10-CM

## 2019-04-02 DIAGNOSIS — Z95.5 PRESENCE OF CORONARY ANGIOPLASTY IMPLANT AND GRAFT: ICD-10-CM

## 2019-04-02 DIAGNOSIS — Z79.01 LONG TERM (CURRENT) USE OF ANTICOAGULANTS: ICD-10-CM

## 2019-04-02 DIAGNOSIS — Z88.8 ALLERGY STATUS TO OTHER DRUGS, MEDICAMENTS AND BIOLOGICAL SUBSTANCES: ICD-10-CM

## 2019-04-05 ENCOUNTER — OUTPATIENT (OUTPATIENT)
Dept: OUTPATIENT SERVICES | Facility: HOSPITAL | Age: 84
LOS: 1 days | Discharge: HOME | End: 2019-04-05

## 2019-04-05 DIAGNOSIS — K64.9 UNSPECIFIED HEMORRHOIDS: ICD-10-CM

## 2019-04-06 ENCOUNTER — OUTPATIENT (OUTPATIENT)
Dept: OUTPATIENT SERVICES | Facility: HOSPITAL | Age: 84
LOS: 1 days | Discharge: HOME | End: 2019-04-06

## 2019-04-06 DIAGNOSIS — E87.5 HYPERKALEMIA: ICD-10-CM

## 2019-04-08 ENCOUNTER — OUTPATIENT (OUTPATIENT)
Dept: OUTPATIENT SERVICES | Facility: HOSPITAL | Age: 84
LOS: 1 days | Discharge: HOME | End: 2019-04-08

## 2019-04-08 DIAGNOSIS — Z01.411 ENCOUNTER FOR GYNECOLOGICAL EXAMINATION (GENERAL) (ROUTINE) WITH ABNORMAL FINDINGS: ICD-10-CM

## 2019-04-10 ENCOUNTER — APPOINTMENT (OUTPATIENT)
Dept: HEART AND VASCULAR | Facility: CLINIC | Age: 84
End: 2019-04-10

## 2019-04-17 ENCOUNTER — OUTPATIENT (OUTPATIENT)
Dept: OUTPATIENT SERVICES | Facility: HOSPITAL | Age: 84
LOS: 1 days | Discharge: HOME | End: 2019-04-17

## 2019-04-17 DIAGNOSIS — E78.5 HYPERLIPIDEMIA, UNSPECIFIED: ICD-10-CM

## 2019-04-17 DIAGNOSIS — E87.6 HYPOKALEMIA: ICD-10-CM

## 2019-04-17 DIAGNOSIS — E03.9 HYPOTHYROIDISM, UNSPECIFIED: ICD-10-CM

## 2019-04-17 DIAGNOSIS — E87.5 HYPERKALEMIA: ICD-10-CM

## 2019-04-18 ENCOUNTER — OUTPATIENT (OUTPATIENT)
Dept: OUTPATIENT SERVICES | Facility: HOSPITAL | Age: 84
LOS: 1 days | Discharge: HOME | End: 2019-04-18

## 2019-04-18 DIAGNOSIS — D64.9 ANEMIA, UNSPECIFIED: ICD-10-CM

## 2019-04-19 ENCOUNTER — OUTPATIENT (OUTPATIENT)
Dept: OUTPATIENT SERVICES | Facility: HOSPITAL | Age: 84
LOS: 1 days | Discharge: HOME | End: 2019-04-19

## 2019-04-19 DIAGNOSIS — D64.9 ANEMIA, UNSPECIFIED: ICD-10-CM

## 2019-04-22 ENCOUNTER — OUTPATIENT (OUTPATIENT)
Dept: OUTPATIENT SERVICES | Facility: HOSPITAL | Age: 84
LOS: 1 days | Discharge: HOME | End: 2019-04-22

## 2019-04-22 DIAGNOSIS — D64.9 ANEMIA, UNSPECIFIED: ICD-10-CM

## 2019-04-24 ENCOUNTER — OUTPATIENT (OUTPATIENT)
Dept: OUTPATIENT SERVICES | Facility: HOSPITAL | Age: 84
LOS: 1 days | Discharge: HOME | End: 2019-04-24

## 2019-04-24 DIAGNOSIS — D64.9 ANEMIA, UNSPECIFIED: ICD-10-CM

## 2019-04-26 ENCOUNTER — OUTPATIENT (OUTPATIENT)
Dept: OUTPATIENT SERVICES | Facility: HOSPITAL | Age: 84
LOS: 1 days | Discharge: HOME | End: 2019-04-26

## 2019-04-26 DIAGNOSIS — R19.7 DIARRHEA, UNSPECIFIED: ICD-10-CM

## 2019-04-30 ENCOUNTER — OUTPATIENT (OUTPATIENT)
Dept: OUTPATIENT SERVICES | Facility: HOSPITAL | Age: 84
LOS: 1 days | Discharge: HOME | End: 2019-04-30

## 2019-04-30 DIAGNOSIS — R19.7 DIARRHEA, UNSPECIFIED: ICD-10-CM

## 2019-04-30 DIAGNOSIS — D64.9 ANEMIA, UNSPECIFIED: ICD-10-CM

## 2019-05-04 ENCOUNTER — OUTPATIENT (OUTPATIENT)
Dept: OUTPATIENT SERVICES | Facility: HOSPITAL | Age: 84
LOS: 1 days | Discharge: HOME | End: 2019-05-04

## 2019-05-04 DIAGNOSIS — D64.9 ANEMIA, UNSPECIFIED: ICD-10-CM

## 2019-05-06 ENCOUNTER — OUTPATIENT (OUTPATIENT)
Dept: OUTPATIENT SERVICES | Facility: HOSPITAL | Age: 84
LOS: 1 days | Discharge: HOME | End: 2019-05-06

## 2019-05-06 DIAGNOSIS — R53.1 WEAKNESS: ICD-10-CM

## 2019-05-06 DIAGNOSIS — D64.9 ANEMIA, UNSPECIFIED: ICD-10-CM

## 2019-05-06 DIAGNOSIS — R19.7 DIARRHEA, UNSPECIFIED: ICD-10-CM

## 2019-05-15 ENCOUNTER — OUTPATIENT (OUTPATIENT)
Dept: OUTPATIENT SERVICES | Facility: HOSPITAL | Age: 84
LOS: 1 days | Discharge: HOME | End: 2019-05-15

## 2019-05-15 DIAGNOSIS — D64.9 ANEMIA, UNSPECIFIED: ICD-10-CM

## 2019-05-15 DIAGNOSIS — R19.7 DIARRHEA, UNSPECIFIED: ICD-10-CM

## 2019-06-21 ENCOUNTER — OUTPATIENT (OUTPATIENT)
Dept: OUTPATIENT SERVICES | Facility: HOSPITAL | Age: 84
LOS: 1 days | Discharge: HOME | End: 2019-06-21

## 2019-06-21 DIAGNOSIS — R19.7 DIARRHEA, UNSPECIFIED: ICD-10-CM

## 2019-07-10 ENCOUNTER — OUTPATIENT (OUTPATIENT)
Dept: OUTPATIENT SERVICES | Facility: HOSPITAL | Age: 84
LOS: 1 days | Discharge: HOME | End: 2019-07-10

## 2019-07-10 DIAGNOSIS — E03.9 HYPOTHYROIDISM, UNSPECIFIED: ICD-10-CM

## 2019-07-10 DIAGNOSIS — E78.5 HYPERLIPIDEMIA, UNSPECIFIED: ICD-10-CM

## 2019-07-10 DIAGNOSIS — E87.6 HYPOKALEMIA: ICD-10-CM

## 2019-07-10 DIAGNOSIS — E87.5 HYPERKALEMIA: ICD-10-CM
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
## 2019-10-02 ENCOUNTER — OUTPATIENT (OUTPATIENT)
Dept: OUTPATIENT SERVICES | Facility: HOSPITAL | Age: 84
LOS: 1 days | Discharge: HOME | End: 2019-10-02

## 2019-10-02 DIAGNOSIS — E87.5 HYPERKALEMIA: ICD-10-CM

## 2019-10-02 DIAGNOSIS — E03.9 HYPOTHYROIDISM, UNSPECIFIED: ICD-10-CM

## 2019-10-02 DIAGNOSIS — E78.5 HYPERLIPIDEMIA, UNSPECIFIED: ICD-10-CM

## 2019-10-02 DIAGNOSIS — E87.6 HYPOKALEMIA: ICD-10-CM

## 2019-10-10 ENCOUNTER — OUTPATIENT (OUTPATIENT)
Dept: OUTPATIENT SERVICES | Facility: HOSPITAL | Age: 84
LOS: 1 days | Discharge: HOME | End: 2019-10-10

## 2019-10-10 DIAGNOSIS — D64.9 ANEMIA, UNSPECIFIED: ICD-10-CM

## 2019-10-11 ENCOUNTER — OUTPATIENT (OUTPATIENT)
Dept: OUTPATIENT SERVICES | Facility: HOSPITAL | Age: 84
LOS: 1 days | Discharge: HOME | End: 2019-10-11

## 2019-10-11 DIAGNOSIS — R21 RASH AND OTHER NONSPECIFIC SKIN ERUPTION: ICD-10-CM

## 2019-10-11 DIAGNOSIS — B00.9 HERPESVIRAL INFECTION, UNSPECIFIED: ICD-10-CM

## 2019-10-16 ENCOUNTER — OUTPATIENT (OUTPATIENT)
Dept: OUTPATIENT SERVICES | Facility: HOSPITAL | Age: 84
LOS: 1 days | Discharge: HOME | End: 2019-10-16

## 2019-10-16 DIAGNOSIS — F03.90 UNSPECIFIED DEMENTIA WITHOUT BEHAVIORAL DISTURBANCE: ICD-10-CM

## 2019-10-17 ENCOUNTER — APPOINTMENT (OUTPATIENT)
Dept: HEART AND VASCULAR | Facility: CLINIC | Age: 84
End: 2019-10-17
Payer: MEDICARE

## 2019-10-17 ENCOUNTER — NON-APPOINTMENT (OUTPATIENT)
Age: 84
End: 2019-10-17

## 2019-10-17 VITALS — HEIGHT: 65 IN

## 2019-10-17 VITALS — SYSTOLIC BLOOD PRESSURE: 120 MMHG | DIASTOLIC BLOOD PRESSURE: 80 MMHG

## 2019-10-17 DIAGNOSIS — I82.409 ACUTE EMBOLISM AND THROMBOSIS OF UNSPECIFIED DEEP VEINS OF UNSPECIFIED LOWER EXTREMITY: ICD-10-CM

## 2019-10-17 PROCEDURE — 93000 ELECTROCARDIOGRAM COMPLETE: CPT

## 2019-10-17 PROCEDURE — 99214 OFFICE O/P EST MOD 30 MIN: CPT

## 2019-10-17 RX ORDER — ATORVASTATIN CALCIUM 10 MG/1
10 TABLET, FILM COATED ORAL
Qty: 90 | Refills: 0 | Status: ACTIVE | COMMUNITY

## 2019-10-17 RX ORDER — CLOPIDOGREL BISULFATE 75 MG/1
75 TABLET, FILM COATED ORAL DAILY
Qty: 90 | Refills: 3 | Status: DISCONTINUED | COMMUNITY
End: 2019-10-17

## 2019-10-17 RX ORDER — APIXABAN 2.5 MG/1
2.5 TABLET, FILM COATED ORAL
Qty: 60 | Refills: 3 | Status: ACTIVE | COMMUNITY

## 2019-10-17 RX ORDER — LISINOPRIL 10 MG/1
10 TABLET ORAL
Qty: 90 | Refills: 3 | Status: ACTIVE | COMMUNITY

## 2019-10-17 RX ORDER — METOPROLOL TARTRATE 25 MG/1
25 TABLET, FILM COATED ORAL
Qty: 60 | Refills: 3 | Status: ACTIVE | COMMUNITY
Start: 2018-09-16

## 2019-10-17 NOTE — ASSESSMENT
[FreeTextEntry1] : Impression\par Patient has a history of chronic ischemic heart disease status post prior myocardial infarction she demonstrates no signs of active angina or decompensated heart failure at the current time and is maintained on appropriate post myocardial infarction medications. Plavix was discontinued due to a drop in hemoglobin but she's currently on eliquis  for a DVT which was noted approximately 2 weeks ago when she had bilateral edema.\par I believe chronic Dafne gross therapy is reasonable considering her sedentary profile. Workup should be done to investigate the etiology of her anemia including iron studies B12 folate. Cardiac meds should continue as prescribed

## 2019-10-17 NOTE — PHYSICAL EXAM
[General Appearance - Well Developed] : well developed [Normal Appearance] : normal appearance [General Appearance - Well Nourished] : well nourished [Well Groomed] : well groomed [No Deformities] : no deformities [General Appearance - In No Acute Distress] : no acute distress [Eyelids - No Xanthelasma] : the eyelids demonstrated no xanthelasmas [Normal Conjunctiva] : the conjunctiva exhibited no abnormalities [No Oral Pallor] : no oral pallor [Normal Oral Mucosa] : normal oral mucosa [No Oral Cyanosis] : no oral cyanosis [Normal Jugular Venous A Waves Present] : normal jugular venous A waves present [Normal Jugular Venous V Waves Present] : normal jugular venous V waves present [No Jugular Venous Ferrer A Waves] : no jugular venous ferrer A waves [Respiration, Rhythm And Depth] : normal respiratory rhythm and effort [Exaggerated Use Of Accessory Muscles For Inspiration] : no accessory muscle use [Auscultation Breath Sounds / Voice Sounds] : lungs were clear to auscultation bilaterally [Normal S1] : normal S1 [Normal S2] : normal S2 [II] : a grade 2 [No Pitting Edema] : no pitting edema present [Abdomen Soft] : soft [Abdomen Tenderness] : non-tender [] : no hepato-splenomegaly [Abdomen Mass (___ Cm)] : no abdominal mass palpated [Right Carotid Bruit] : no bruit heard over the right carotid [Left Carotid Bruit] : no bruit heard over the left carotid [FreeTextEntry1] : no edeema no ayana sign noted

## 2019-10-17 NOTE — REVIEW OF SYSTEMS
[Feeling Fatigued] : feeling fatigued [Shortness Of Breath] : shortness of breath [Negative] : Constitutional [Dyspnea on exertion] : dyspnea during exertion
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
## 2019-10-24 ENCOUNTER — OUTPATIENT (OUTPATIENT)
Dept: OUTPATIENT SERVICES | Facility: HOSPITAL | Age: 84
LOS: 1 days | Discharge: HOME | End: 2019-10-24

## 2019-10-24 DIAGNOSIS — R21 RASH AND OTHER NONSPECIFIC SKIN ERUPTION: ICD-10-CM

## 2019-10-24 DIAGNOSIS — D64.9 ANEMIA, UNSPECIFIED: ICD-10-CM

## 2019-10-24 DIAGNOSIS — Z91.018 ALLERGY TO OTHER FOODS: ICD-10-CM

## 2019-10-31 ENCOUNTER — OUTPATIENT (OUTPATIENT)
Dept: OUTPATIENT SERVICES | Facility: HOSPITAL | Age: 84
LOS: 1 days | Discharge: HOME | End: 2019-10-31

## 2019-10-31 DIAGNOSIS — D64.9 ANEMIA, UNSPECIFIED: ICD-10-CM

## 2019-11-01 ENCOUNTER — OUTPATIENT (OUTPATIENT)
Dept: OUTPATIENT SERVICES | Facility: HOSPITAL | Age: 84
LOS: 1 days | Discharge: HOME | End: 2019-11-01

## 2019-11-01 DIAGNOSIS — D64.9 ANEMIA, UNSPECIFIED: ICD-10-CM

## 2019-11-18 ENCOUNTER — OUTPATIENT (OUTPATIENT)
Dept: OUTPATIENT SERVICES | Facility: HOSPITAL | Age: 84
LOS: 1 days | Discharge: HOME | End: 2019-11-18

## 2019-11-18 DIAGNOSIS — D64.9 ANEMIA, UNSPECIFIED: ICD-10-CM

## 2019-12-06 ENCOUNTER — OUTPATIENT (OUTPATIENT)
Dept: OUTPATIENT SERVICES | Facility: HOSPITAL | Age: 84
LOS: 1 days | Discharge: HOME | End: 2019-12-06

## 2019-12-06 DIAGNOSIS — D64.9 ANEMIA, UNSPECIFIED: ICD-10-CM

## 2019-12-19 NOTE — DISCHARGE NOTE NURSING/CASE MANAGEMENT/SOCIAL WORK - NSTOBACCONEVERSMOKERY/N_GEN_A
"Sylvia Frias RN   Phone [phone]  Fax [phone]      NeffConchita britton (47 y.o. Female)     Date of Birth Social Security Number Address Home Phone MRN    1972  [id number] [facility] [address] [phone] [id number]    Amish Marital Status          Voodoo        Admission Date Admission Type Admitting Provider Attending Provider Department, Room/Bed    12/19/19 Elective Narendra Jacobs MD Weiss, George Derek, MD [address], [address]/1    Discharge Date Discharge Disposition Discharge Destination                       Attending Provider:  Narendra Jacobs MD    Allergies:  Fish-derived Products, Peanut-containing Drug Products, Amoxil [Amoxicillin], Lovenox [Enoxaparin], Sulfa Antibiotics    Isolation:  None   Infection:  None   Code Status:  CPR    Ht:  161.3 cm (63.5\")   Wt:  81.9 kg (180 lb 8 oz)    Admission Cmt:  None   Principal Problem:  None                Active Insurance as of 12/19/2019     Primary Coverage     Payor Plan Insurance Group Employer/Plan Group    [facility] [id number]     Payor Plan Address Payor Plan Phone Number Payor Plan Fax Number Effective Dates    [address]   1/1/2016 - None Entered    [facility] [id number]       Subscriber Name Subscriber Birth Date Member ID       CONCHITA NEFF 1972 [id number]           Secondary Coverage     Payor Plan Insurance Group Employer/Plan Group    [facility] [id number]     Payor Plan Address Payor Plan Phone Number Payor Plan Fax Number Effective Dates    [address] [phone]  6/1/2011 - None Entered    [facility] [id number]       Subscriber Name Subscriber Birth Date Member ID       CONCHITA NEFF 1972 [id number]                 Emergency Contacts      (Rel.) Home Phone Work Phone Mobile Phone    Kelli Neffron (Spouse) [phone] -- [phone]               History & Physical      Narendra Jacobs MD at " "19 0836          H&P reviewed. The patient was examined and there are no changes to the H&P.          Electronically signed by Narendra Jacobs MD at 19 0836   Source Note          [facility] BARIATRIC SURGERY  [address]  [facility] [address]  [phone]      Patient  Name:  Conchita Acevedo  :  1972      Date of Visit: 19    Chief Complaint: Postprandial epigastric pain and persistent regurgitation    History of Present Illness:  Conchita Acevedo is a 47 y.o. female who presents today for evaluation, education and consultation regarding her ongoing severe symptoms of reflux and regurgitation.  Once again she is known to me status post laparoscopic sleeve gastrectomy in 2010, laparoscopic cholecystectomy in 2011.  I then performed laparoscopic hiatal hernia repair and resection of redundant cardia of her sleeve in 2015.  She said she did well from that standpoint and no symptoms until the end of  early .  The severe heartburn pain is now somewhat better with Dexilant.  She still has regurgitation.  She says ever since her sleeve her abdomen has a loud rumbling noise that her family can hear across the room.  She says she has a high pain tolerance and of all her previous surgeries the hiatal hernia repair was the worst.  She says she could not swallow for days and had a stay in the hospital an extra day.  I had her come in today to discuss options.  Upper GI dated 2019 shows a \"very small intermittent sliding-type hiatal hernia less than 2 cm\" also noted was marketed GE reflux and a dilated esophagus to the level of the cervical esophagus.  I went over her EGD dated 2019 showing a fairly unremarkable sleeve, a tiny intermittent recurrent sliding hiatal hernia with an irregular Z line with inability to rule out short segment York's.  Z line was at 34 cm.  Pathology of the antrum was negative for H. pylori " distal esophageal biopsies were consistent with reflux.  I did review Dori Patel's most recent evaluation dated 11/4/2019.  Since last seen 11/14/2019 she has gained 1-1/2 pounds.  She weighs 180.5 pounds with a BMI of 31.5.      Past Medical History:   Diagnosis Date   • Acid reflux    • Anxiety    • Diabetes mellitus (CMS/HCC)    • MRSA infection    • PCOS (polycystic ovarian syndrome)    • Thyroid disorder      Past Surgical History:   Procedure Laterality Date   • BACK SURGERY  2015   • BLADDER SLING MODIFIED, ANTERIOR AND POSTERIOR VAGINAL REPAIR  2016   • ENDOSCOPY  12/19/2016     Dr. Jacobs, some mild diffuse gastritis,   • ENDOSCOPY  08/03/2016    Dr. Jacobs, mild gastrits and large surture at GE junction with surrounding foreign body reaction, suture was removed, no obvious HH, sleeve was fairly unremarkable. Distal esphageal bx revealsed ulcerative esophagitis with candida and gram + cocci.    • GASTRIC SLEEVE LAPAROSCOPIC  07/08/2010   • HIATAL HERNIA REPAIR  07/2015    Dr. Jacobs, [facility] partial gastectomy for outpouching of cardia   • HYSTERECTOMY  2018    vaginal, partial   • LAPAROSCOPIC CHOLECYSTECTOMY  04/13/2011    Dr. Jacobs       Allergies   Allergen Reactions   • Amoxil [Amoxicillin] GI Intolerance   • Lovenox [Enoxaparin] Rash   • Sulfa Antibiotics Rash       Current Outpatient Medications:   •  aspirin 81 MG EC tablet, Take 81 mg by mouth Daily., Disp: , Rfl:   •  dexlansoprazole (DEXILANT) 60 MG capsule, Take 1 capsule by mouth Daily., Disp: 30 capsule, Rfl: 11  •  escitalopram (LEXAPRO) 20 MG tablet, Take 20 mg by mouth Daily., Disp: , Rfl:   •  metFORMIN (GLUCOPHAGE) 1000 MG tablet, Take 1,000 mg by mouth 2 (Two) Times a Day With Meals., Disp: , Rfl:   •  Multiple Vitamin (MULTI VITAMIN PO), Take  by mouth Daily., Disp: , Rfl:   •  Thyroid 60 MG PO tablet, Take 45 mg by mouth Daily., Disp: , Rfl:     Social History     Socioeconomic History   • Marital status:      Spouse name: Not on  No file   • Number of children: Not on file   • Years of education: Not on file   • Highest education level: Not on file   Tobacco Use   • Smoking status: Never Smoker   • Smokeless tobacco: Never Used   Substance and Sexual Activity   • Alcohol use: No     Frequency: Never   • Drug use: No   • Sexual activity: Defer   Social History Narrative    Lives in [facility] with  and 2 daughters. Works as .      Family History   Problem Relation Age of Onset   • Diabetes Mother    • Heart disease Mother    • Kidney failure Mother    • Heart disease Father    • Stroke Father    • Diabetes Father    • Heart attack Father    • Heart disease Maternal Grandmother    • Diabetes Maternal Grandmother    • Stroke Maternal Grandmother    • Heart disease Paternal Grandmother    • Diabetes Paternal Grandmother    • Stroke Paternal Grandmother        Review of Systems   Constitutional: Positive for fatigue and unexpected weight gain. Negative for chills, diaphoresis, fever and unexpected weight loss.   HENT: Negative for congestion and facial swelling.    Eyes: Negative for blurred vision, double vision and discharge.   Respiratory: Negative for chest tightness, shortness of breath and stridor.    Cardiovascular: Negative for chest pain, palpitations and leg swelling.   Gastrointestinal: Positive for abdominal pain and GERD. Negative for blood in stool.   Endocrine: Negative for polydipsia.   Genitourinary: Negative for hematuria.   Musculoskeletal: Positive for arthralgias.   Skin: Negative for color change.   Allergic/Immunologic: Negative for immunocompromised state.   Neurological: Negative for confusion.   Psychiatric/Behavioral: Negative for self-injury.       I have reviewed the ROS and confirm that it's accurate today.    Physical Exam:  Vital Signs:  Weight: 81.9 kg (180 lb 8 oz)   Body mass index is 31.47 kg/m².  Temp: 96.2 °F (35.7 °C)   Heart Rate: 65   BP: 120/74     Physical Exam   Constitutional: She is  oriented to person, place, and time. She appears well-developed and well-nourished.   HENT:   Head: Normocephalic and atraumatic.   Nose: Nose normal.   Eyes: Conjunctivae and EOM are normal. Pupils are equal, round, and reactive to light.   Neck: Normal range of motion. Neck supple. Carotid bruit is not present. No tracheal deviation present. No thyromegaly present.   Cardiovascular: Normal rate, regular rhythm and normal heart sounds.   Pulmonary/Chest: Effort normal and breath sounds normal. No respiratory distress.   Abdominal: Soft. She exhibits no distension. There is no hepatosplenomegaly. There is no tenderness.   Upper laparoscopy scars lower midline scar   Musculoskeletal: Normal range of motion. She exhibits no edema or deformity.   Neurological: She is alert and oriented to person, place, and time. No cranial nerve deficit. Coordination normal.   Skin: Skin is warm and dry. No rash noted.   Psychiatric: She has a normal mood and affect. Her behavior is normal. Judgment and thought content normal.   Vitals reviewed.      Patient Active Problem List   Diagnosis   • Palpitations   • Chest discomfort   • Chest pain due to GERD   • Family history of early CAD   • Type 2 diabetes mellitus with complication, without long-term current use of insulin (CMS/HCC)   • Low ferritin   • Thyroid dysfunction   • Thyroid disorder   • Diabetes mellitus (CMS/HCC)   • MRSA infection   • Anxiety   • Acid reflux   • PCOS (polycystic ovarian syndrome)       Assessment:    Conchita Acevedo is a 47 y.o. year old female with severe persistent reflux regurgitation and a possible small recurrent intermittent sliding hiatal hernia status post laparoscopic sleeve gastrectomy July 2010 and laparoscopic hiatal hernia repair and resection of redundant cardia in July 2015.  Symptoms recurred at the end of 2018.  EGD and upper GI showed a small recurrent intermittent sliding hiatal hernia and distal esophageal biopsies show  reflux.        Plan: We had a long discussion today concerning the risks benefits and alternative therapies and she wishes to proceed with laparoscopic recurrent hiatal hernia repair and intraoperative evaluation of her sleeve and EGD.  The last hiatal hernia repair did alleviate her symptoms for a few years.  If the sleeve appears twisted or causing a partial obstruction then proceed with laparoscopic possible gastrojejunostomy.  She understands that the traditional recommendation would be conversion to a laparoscopic Afshan-en-Y gastric bypass and after discussion of risks benefits and alternative therapy she is not interested in this at this time.  Given her severe pain with the last operation we will plan for anesthesia to perform a tap block.  She says she can tolerate Keflex without difficulty.      Narendra Jacobs MD                Electronically signed by Narendra Jacobs MD at 19 1649             Narendra Jacobs MD at 19 1530          [facility] BARIATRIC SURGERY  [address]  [facility] [address]  [phone]      Patient  Name:  Conchita Acevedo  :  1972      Date of Visit: 19    Chief Complaint: Postprandial epigastric pain and persistent regurgitation    History of Present Illness:  Conchita Acevedo is a 47 y.o. female who presents today for evaluation, education and consultation regarding her ongoing severe symptoms of reflux and regurgitation.  Once again she is known to me status post laparoscopic sleeve gastrectomy in 2010, laparoscopic cholecystectomy in 2011.  I then performed laparoscopic hiatal hernia repair and resection of redundant cardia of her sleeve in 2015.  She said she did well from that standpoint and no symptoms until the end of 2018 early .  The severe heartburn pain is now somewhat better with Dexilant.  She still has regurgitation.  She says ever since her sleeve her abdomen has a  "loud rumbling noise that her family can hear across the room.  She says she has a high pain tolerance and of all her previous surgeries the hiatal hernia repair was the worst.  She says she could not swallow for days and had a stay in the hospital an extra day.  I had her come in today to discuss options.  Upper GI dated 9/27/2019 shows a \"very small intermittent sliding-type hiatal hernia less than 2 cm\" also noted was marketed GE reflux and a dilated esophagus to the level of the cervical esophagus.  I went over her EGD dated 11/18/2019 showing a fairly unremarkable sleeve, a tiny intermittent recurrent sliding hiatal hernia with an irregular Z line with inability to rule out short segment York's.  Z line was at 34 cm.  Pathology of the antrum was negative for H. pylori distal esophageal biopsies were consistent with reflux.  I did review Dori Patel's most recent evaluation dated 11/4/2019.  Since last seen 11/14/2019 she has gained 1-1/2 pounds.  She weighs 180.5 pounds with a BMI of 31.5.      Past Medical History:   Diagnosis Date   • Acid reflux    • Anxiety    • Diabetes mellitus (CMS/HCC)    • MRSA infection    • PCOS (polycystic ovarian syndrome)    • Thyroid disorder      Past Surgical History:   Procedure Laterality Date   • BACK SURGERY  2015   • BLADDER SLING MODIFIED, ANTERIOR AND POSTERIOR VAGINAL REPAIR  2016   • ENDOSCOPY  12/19/2016     Dr. Jacobs, some mild diffuse gastritis,   • ENDOSCOPY  08/03/2016    Dr. Jacobs, mild gastrits and large surture at GE junction with surrounding foreign body reaction, suture was removed, no obvious HH, sleeve was fairly unremarkable. Distal esphageal bx revealsed ulcerative esophagitis with candida and gram + cocci.    • GASTRIC SLEEVE LAPAROSCOPIC  07/08/2010   • HIATAL HERNIA REPAIR  07/2015    Dr. Jacobs, [facility] partial gastectomy for outpouching of cardia   • HYSTERECTOMY  2018    vaginal, partial   • LAPAROSCOPIC CHOLECYSTECTOMY  04/13/2011    Dr. Jacobs "       Allergies   Allergen Reactions   • Amoxil [Amoxicillin] GI Intolerance   • Lovenox [Enoxaparin] Rash   • Sulfa Antibiotics Rash       Current Outpatient Medications:   •  aspirin 81 MG EC tablet, Take 81 mg by mouth Daily., Disp: , Rfl:   •  dexlansoprazole (DEXILANT) 60 MG capsule, Take 1 capsule by mouth Daily., Disp: 30 capsule, Rfl: 11  •  escitalopram (LEXAPRO) 20 MG tablet, Take 20 mg by mouth Daily., Disp: , Rfl:   •  metFORMIN (GLUCOPHAGE) 1000 MG tablet, Take 1,000 mg by mouth 2 (Two) Times a Day With Meals., Disp: , Rfl:   •  Multiple Vitamin (MULTI VITAMIN PO), Take  by mouth Daily., Disp: , Rfl:   •  Thyroid 60 MG PO tablet, Take 45 mg by mouth Daily., Disp: , Rfl:     Social History     Socioeconomic History   • Marital status:      Spouse name: Not on file   • Number of children: Not on file   • Years of education: Not on file   • Highest education level: Not on file   Tobacco Use   • Smoking status: Never Smoker   • Smokeless tobacco: Never Used   Substance and Sexual Activity   • Alcohol use: No     Frequency: Never   • Drug use: No   • Sexual activity: Defer   Social History Narrative    L[facility] in [facility] with  and 2 daughters. Works as .      Family History   Problem Relation Age of Onset   • Diabetes Mother    • Heart disease Mother    • Kidney failure Mother    • Heart disease Father    • Stroke Father    • Diabetes Father    • Heart attack Father    • Heart disease Maternal Grandmother    • Diabetes Maternal Grandmother    • Stroke Maternal Grandmother    • Heart disease Paternal Grandmother    • Diabetes Paternal Grandmother    • Stroke Paternal Grandmother        Review of Systems   Constitutional: Positive for fatigue and unexpected weight gain. Negative for chills, diaphoresis, fever and unexpected weight loss.   HENT: Negative for congestion and facial swelling.    Eyes: Negative for blurred vision, double vision and discharge.   Respiratory: Negative  for chest tightness, shortness of breath and stridor.    Cardiovascular: Negative for chest pain, palpitations and leg swelling.   Gastrointestinal: Positive for abdominal pain and GERD. Negative for blood in stool.   Endocrine: Negative for polydipsia.   Genitourinary: Negative for hematuria.   Musculoskeletal: Positive for arthralgias.   Skin: Negative for color change.   Allergic/Immunologic: Negative for immunocompromised state.   Neurological: Negative for confusion.   Psychiatric/Behavioral: Negative for self-injury.       I have reviewed the ROS and confirm that it's accurate today.    Physical Exam:  Vital Signs:  Weight: 81.9 kg (180 lb 8 oz)   Body mass index is 31.47 kg/m².  Temp: 96.2 °F (35.7 °C)   Heart Rate: 65   BP: 120/74     Physical Exam   Constitutional: She is oriented to person, place, and time. She appears well-developed and well-nourished.   HENT:   Head: Normocephalic and atraumatic.   Nose: Nose normal.   Eyes: Conjunctivae and EOM are normal. Pupils are equal, round, and reactive to light.   Neck: Normal range of motion. Neck supple. Carotid bruit is not present. No tracheal deviation present. No thyromegaly present.   Cardiovascular: Normal rate, regular rhythm and normal heart sounds.   Pulmonary/Chest: Effort normal and breath sounds normal. No respiratory distress.   Abdominal: Soft. She exhibits no distension. There is no hepatosplenomegaly. There is no tenderness.   Upper laparoscopy scars lower midline scar   Musculoskeletal: Normal range of motion. She exhibits no edema or deformity.   Neurological: She is alert and oriented to person, place, and time. No cranial nerve deficit. Coordination normal.   Skin: Skin is warm and dry. No rash noted.   Psychiatric: She has a normal mood and affect. Her behavior is normal. Judgment and thought content normal.   Vitals reviewed.      Patient Active Problem List   Diagnosis   • Palpitations   • Chest discomfort   • Chest pain due to GERD   •  Family history of early CAD   • Type 2 diabetes mellitus with complication, without long-term current use of insulin (CMS/HCC)   • Low ferritin   • Thyroid dysfunction   • Thyroid disorder   • Diabetes mellitus (CMS/HCC)   • MRSA infection   • Anxiety   • Acid reflux   • PCOS (polycystic ovarian syndrome)       Assessment:    Conchita Acevedo is a 47 y.o. year old female with severe persistent reflux regurgitation and a possible small recurrent intermittent sliding hiatal hernia status post laparoscopic sleeve gastrectomy July 2010 and laparoscopic hiatal hernia repair and resection of redundant cardia in July 2015.  Symptoms recurred at the end of 2018.  EGD and upper GI showed a small recurrent intermittent sliding hiatal hernia and distal esophageal biopsies show reflux.        Plan: We had a long discussion today concerning the risks benefits and alternative therapies and she wishes to proceed with laparoscopic recurrent hiatal hernia repair and intraoperative evaluation of her sleeve and EGD.  The last hiatal hernia repair did alleviate her symptoms for a few years.  If the sleeve appears twisted or causing a partial obstruction then proceed with laparoscopic possible gastrojejunostomy.  She understands that the traditional recommendation would be conversion to a laparoscopic Afshan-en-Y gastric bypass and after discussion of risks benefits and alternative therapy she is not interested in this at this time.  Given her severe pain with the last operation we will plan for anesthesia to perform a tap block.  She says she can tolerate Keflex without difficulty.      Narendra Jacobs MD                Electronically signed by Narendra Jacobs MD at 12/01/19 5337       Vital Signs (last 2 days)     Date/Time   Temp   Temp src   Pulse   Resp   BP   Patient Position   SpO2    12/19/19 1420   --   --   82   16   --   --   96    12/19/19 1354   98 (36.7)   Oral   88   16   143/81   Lying   96    12/19/19 1315    98.5 (36.9)   Temporal   83   16   136/78   --   97    12/19/19 1300   98.5 (36.9)   Temporal   86   16   139/71   --   96    12/19/19 1245   98.5 (36.9)   Temporal   88   16   136/67   --   97    12/19/19 1235   98.5 (36.9)   Temporal   85   16   133/67   --   98    12/19/19 1228   98.8 (37.1)   Temporal   85   16   135/68   Lying   98            Hospital Medications (all)       Dose Frequency Start End    acetaminophen (TYLENOL) tablet 1,000 mg (Completed) 1,000 mg Once 12/19/2019 12/19/2019    Admin Instructions: Do not exceed 4 grams of acetaminophen in a 24 hr period.<BR><BR>If given for pain, use the following pain scale: <BR>Mild Pain = Pain Score of 1-3, CPOT 1-2<BR>Moderate Pain = Pain Score of 4-6, CPOT 3-4<BR>Severe Pain = Pain Score of 7-10, CPOT 5-8    Route: Oral    acetaminophen (TYLENOL) tablet 1,000 mg 1,000 mg Every 12 Hours 12/19/2019 12/21/2019    Admin Instructions: Do not exceed 4 grams of acetaminophen in a 24 hr period.<BR><BR>If given for pain, use the following pain scale: <BR>Mild Pain = Pain Score of 1-3, CPOT 1-2<BR>Moderate Pain = Pain Score of 4-6, CPOT 3-4<BR>Severe Pain = Pain Score of 7-10, CPOT 5-8    Route: Oral    albuterol (PROVENTIL) nebulizer solution 0.083% 2.5 mg/3mL 2.5 mg Every 4 Hours PRN 12/19/2019     Route: Nebulization    ALPRAZolam (XANAX) tablet 0.25 mg 0.25 mg Once As Needed 12/19/2019 12/29/2019    Admin Instructions: Give day one of post-op<BR> Caution: Look alike/sound alike drug alert.   Avoid grapefruit juice    Route: Oral    ceFAZolin in dextrose (ANCEF) IVPB solution 2 g (Completed) 2 g Once 12/19/2019 12/19/2019    Admin Instructions: Caution: Look alike/sound alike drug alert    Route: Intravenous    ceFAZolin in dextrose (ANCEF) IVPB solution 2 g 2 g Every 8 Hours 12/19/2019 12/20/2019    Admin Instructions: Do not include dose given in Pre-op.<BR>Caution: Look alike/sound alike drug alert    Route: Intravenous    chlorhexidine (PERIDEX) 0.12 % solution  30 mL (Completed) 30 mL Every 5 Minutes 12/19/2019 12/19/2019    Admin Instructions: Gargle for 60 seconds x 2<BR> Do not swallow.    Route: Mouth/Throat    cyanocobalamin injection 1,000 mcg 1,000 mcg Once 12/20/2019     Admin Instructions: To be administered Post-op day 1    Route: Intramuscular    diphenhydrAMINE (BENADRYL) injection 25 mg 25 mg Every 4 Hours PRN 12/19/2019     Admin Instructions: Caution: Look alike/sound alike drug alert. This med may be ordered in other forms and routes. Before giving verify the last time the drug was given by any route/form.<BR>    Route: Intravenous    escitalopram (LEXAPRO) tablet 20 mg 20 mg Daily 12/19/2019     Admin Instructions: Caution: Look alike/sound alike drug alert.    Route: Oral    ferric gluconate (FERRLECIT)125 MG IVPB 125 mg Once As Needed 12/20/2019     Admin Instructions: Give if iron level less than 50.    Route: Intravenous    gabapentin (NEURONTIN) capsule 100 mg 100 mg 3 Times Daily 12/19/2019 12/21/2019    Admin Instructions:     Route: Oral    gabapentin (NEURONTIN) capsule 600 mg (Completed) 600 mg Once 12/19/2019 12/19/2019    Admin Instructions:     Route: Oral    heparin (porcine) 5000 UNIT/ML injection 5,000 Units 5,000 Units Every 8 Hours Scheduled 12/20/2019     Route: Subcutaneous    heparin (porcine) 5000 UNIT/ML injection  As Needed 12/19/2019     hydrALAZINE (APRESOLINE) injection 10 mg 10 mg Every 30 Minutes PRN 12/19/2019     Admin Instructions: Repeat Dose Once in 30 Minutes if Systolic Blood Pressure > 160 or Diastolic Blood Pressure > 100<BR>Consult Hospitalist if Blood Pressure Remains Elevated After Repeat Dose<BR>Caution: Look alike/sound alike drug alert    Route: Intravenous    HYDROcodone-acetaminophen (NORCO) 7.5-325 MG per tablet 1 tablet 1 tablet Every 4 Hours PRN 12/19/2019 12/29/2019    Route: Oral    HYDROmorphone (DILAUDID) injection 1 mg 1 mg Every 2 Hours PRN 12/19/2019 12/29/2019    Admin Instructions: <BR><BR>Caution:  "Look alike/sound alike drug alert<BR><BR>If given for pain, use the following pain scale:<BR>Mild Pain = Pain Score of 1-3, CPOT 1-2<BR>Moderate Pain = Pain Score of 4-6, CPOT 3-4<BR>Severe Pain = Pain Score of 7-10, CPOT 5-8    Route: Intravenous    Linked Group 1:  \"And\" Linked Group Details        HYDROmorphone (DILAUDID) tablet 2 mg 2 mg Every 4 Hours PRN 12/19/2019 12/29/2019    Admin Instructions: <BR><BR>Caution: Look alike/sound alike drug alert<BR><BR>If given for pain, use the following pain scale:<BR>Mild Pain = Pain Score of 1-3, CPOT 1-2<BR>Moderate Pain = Pain Score of 4-6, CPOT 3-4<BR>Severe Pain = Pain Score of 7-10, CPOT 5-8    Route: Oral    insulin lispro (humaLOG) injection 0-7 Units 0-7 Units 4 Times Daily With Meals & Nightly 12/19/2019     Admin Instructions: Correction - Low Dose.  Less than 40 units/day total insulin dose or lean, elderly, renal patients<BR><BR>Blood glucose 150-199 mg/dL - 2 units<BR>Blood glucose 200-249 mg/dL - 3 units<BR>Blood glucose 250-299 mg/dL - 4 units<BR>Blood glucose 300-349 mg/dL - 5 units<BR>Blood glucose 350-400 mg/dL - 6 units<BR>Blood glucose greater than 400 mg/dL - 7 units and call provider<BR> Caution: Look alike/sound alike drug alert    Route: Subcutaneous    lidocaine PF 1% (XYLOCAINE) injection 0.5 mL (Completed) 0.5 mL Once As Needed 12/19/2019 12/19/2019    Route: Injection    LORazepam (ATIVAN) injection 0.5 mg 0.5 mg Every 12 Hours PRN 12/19/2019 12/29/2019    Admin Instructions:  Caution: Look alike/sound alike drug alert    Route: Intravenous    LORazepam (ATIVAN) tablet 1 mg 1 mg Every 12 Hours PRN 12/19/2019 12/29/2019    Admin Instructions:  Caution: Look alike/sound alike drug alert    Route: Oral    metFORMIN (GLUCOPHAGE) tablet 1,000 mg 1,000 mg 2 Times Daily With Meals 12/19/2019     Admin Instructions: Do not give at time of or within 48 hours of iodinated intravenous contrast. Confirm renal function is normal before " "continuing.<BR>Caution: Look alike/sound alike drug alert    Route: Oral    metoclopramide (REGLAN) injection 10 mg 10 mg Every 6 Hours PRN 12/19/2019     Admin Instructions: Give Zofran first. Give Phenergan if Zofran not effective. Then if Phenergan not effective, give metoclopramide.    Route: Intravenous    Morphine sulfate (PF) injection 4 mg 4 mg Every 2 Hours PRN 12/19/2019 12/29/2019    Admin Instructions: <BR><BR>Caution: Look alike/sound alike drug alert<BR><BR>If given for pain, use the following pain scale:<BR>Mild Pain = Pain Score of 1-3, CPOT 1-2<BR>Moderate Pain = Pain Score of 4-6, CPOT 3-4<BR>Severe Pain = Pain Score of 7-10, CPOT 5-8    Route: Intravenous    Linked Group 2:  \"And\" Linked Group Details        multiple vitamin (M.V.I. Adult) 10 mL, thiamine (B-1) 100 mg, folic acid 1 mg in sodium chloride 0.9 % 1,000 mL infusion 250 mL/hr Once 12/20/2019     Admin Instructions: To be started Post-op day 1.    Route: Intravenous    naloxone (NARCAN) injection 0.1 mg 0.1 mg Every 5 Minutes PRN 12/19/2019     Admin Instructions: If respiratory rate is less than 8 breaths/minute or patient is difficult to arouse stop any narcotics and contact physician. Administer slow IV push. Repeat as ordered until patient's respiratory rate is greater than 12 breaths/minute.    Route: Intravenous    Linked Group 1:  \"And\" Linked Group Details        naloxone (NARCAN) injection 0.4 mg 0.4 mg Every 5 Minutes PRN 12/19/2019     Admin Instructions: If respiratory rate is less than 8 breaths/minute or patient is difficult to arouse stop any narcotics and contact physician. <BR>Administer slow IV push. Repeat as ordered until patient's respiratory rate is greater than 12 breaths/minute.    Route: Intravenous    Linked Group 2:  \"And\" Linked Group Details        ondansetron (ZOFRAN) injection 4 mg 4 mg Every 6 Hours 12/19/2019 12/20/2019    Admin Instructions: Give Zofran first. Give Phenergan if Zofran not effective. Then " "if Phenergan not effective, give metoclopramide.    Route: Intravenous    Linked Group 3:  \"Followed by\" Linked Group Details        ondansetron (ZOFRAN) tablet 4 mg 4 mg Every 6 Hours PRN 12/20/2019     Admin Instructions: Begin PRN zofran after the first 4 scheduled doses have been administered. Give Zofran first. Give Phenergan if Zofran not effective. Then if Phenergan not effective, give metoclopramide.    Route: Oral    Linked Group 3:  \"Followed by\" Linked Group Details        pantoprazole (PROTONIX) injection 40 mg (Completed) 40 mg Once 12/19/2019 12/19/2019    Admin Instructions: Dilute with 10 mL of 0.9% NaCl and give IV push over 2 minutes.    Route: Intravenous    pantoprazole (PROTONIX) injection 40 mg 40 mg Every Early Morning 12/20/2019     Admin Instructions: Dilute with 10 mL of 0.9% NaCl and give IV push over 2 minutes.    Route: Intravenous    phenol (CHLORASEPTIC) 1.4 % liquid 2 spray 2 spray Every 2 Hours PRN 12/19/2019     Admin Instructions:     Route: Mouth/Throat    prochlorperazine (COMPAZINE) tablet 10 mg 10 mg Every 6 Hours PRN 12/19/2019     Route: Oral    promethazine (PHENERGAN) injection 12.5 mg 12.5 mg Every 6 Hours PRN 12/19/2019     Admin Instructions: Give Zofran first. Give Phenergan if Zofran not effective. Then if Phenergan not effective, give metoclopramide.<BR>If giving IV, dilute dose in 20 mL NS and slow IV push over 3-5 minutes.  Administer through large bore vein (not hand or wrist) through running IV line at port furthest from patient's vein.    Route: Intravenous    Linked Group 4:  \"Or\" Linked Group Details        promethazine (PHENERGAN) injection 12.5 mg 12.5 mg Every 6 Hours PRN 12/19/2019     Admin Instructions: Give Zofran first. Give Phenergan if Zofran not effective. Then if Phenergan not effective, give metoclopramide.<BR>If giving IV, dilute dose in 20 mL NS and slow IV push over 3-5 minutes.  Administer through large bore vein (not hand or wrist) through " "running IV line at port furthest from patient's vein.    Route: Intramuscular    Linked Group 4:  \"Or\" Linked Group Details        promethazine (PHENERGAN) tablet 12.5 mg 12.5 mg Every 6 Hours PRN 12/19/2019     Admin Instructions: Give Zofran first. Give Phenergan if Zofran not effective. Then if Phenergan not effective, give metoclopramide.<BR>    Route: Oral    simethicone (MYLICON) chewable tablet 80 mg 80 mg 4 Times Daily PRN 12/19/2019     Route: Oral    sodium chloride 0.45 % with KCl 20 mEq/L infusion 125 mL/hr Continuous 12/20/2019     Admin Instructions: Discontinue any other maintenance IVF's    Route: Intravenous    sodium chloride 0.9 % bolus 1,000 mL (Completed) 1,000 mL Once 12/19/2019 12/19/2019    Route: Intravenous    sodium chloride 0.9 % infusion 125 mL/hr Continuous 12/19/2019     Route: Intravenous    Thyroid (ARMOUR) tablet 45 mg 45 mg Daily 12/20/2019     Route: Oral    fentaNYL citrate (PF) (SUBLIMAZE) injection 50 mcg (Discontinued) 50 mcg Every 5 Minutes PRN 12/19/2019 12/19/2019    Admin Instructions: If given for pain, use the following pain scale:<BR>Mild Pain = Pain Score of 1-3, CPOT 1-2<BR>Moderate Pain = Pain Score of 4-6, CPOT 3-4<BR>Severe Pain = Pain Score of 7-10, CPOT 5-8    Route: Intravenous    Reason for Discontinue: Patient Transfer    fentaNYL citrate (PF) (SUBLIMAZE) injection 50 mcg (Discontinued) 50 mcg Every 5 Minutes PRN 12/19/2019 12/19/2019    Admin Instructions: If given for pain, use the following pain scale:<BR>Mild Pain = Pain Score of 1-3, CPOT 1-2<BR>Moderate Pain = Pain Score of 4-6, CPOT 3-4<BR>Severe Pain = Pain Score of 7-10, CPOT 5-8    Route: Intravenous    Reason for Discontinue: Patient Transfer    floseal injection (Discontinued)  As Needed 12/19/2019 12/19/2019    Reason for Discontinue: Patient Discharge    HYDROmorphone (DILAUDID) injection 0.25 mg (Discontinued) 0.25 mg Every 5 Minutes PRN 12/19/2019 12/19/2019    Admin Instructions: Maximum total " dose of hydromorphone is 2 mg.<BR>If given for pain, use the following pain scale:<BR>Mild Pain = Pain Score of 1-3, CPOT 1-2<BR>Moderate Pain = Pain Score of 4-6, CPOT 3-4<BR>Severe Pain = Pain Score of 7-10, CPOT 5-8    Route: Intravenous    Reason for Discontinue: Patient Transfer    HYDROmorphone (DILAUDID) injection 0.5 mg (Discontinued) 0.5 mg Every 5 Minutes PRN 12/19/2019 12/19/2019    Admin Instructions: Maximum total dose of hydromorphone is 2 mg.<BR>If given for pain, use the following pain scale:<BR>Mild Pain = Pain Score of 1-3, CPOT 1-2<BR>Moderate Pain = Pain Score of 4-6, CPOT 3-4<BR>Severe Pain = Pain Score of 7-10, CPOT 5-8    Route: Intravenous    Reason for Discontinue: Patient Transfer    insulin lispro (humaLOG) injection 0-9 Units (Discontinued) 0-9 Units 4 Times Daily With Meals & Nightly 12/19/2019 12/19/2019    Admin Instructions: Correction - Moderate Dose.  40-60 units/day total insulin dose or average weight, on oral agents<BR><BR>Blood glucose 150-199 mg/dL - 2 units<BR>Blood glucose 200-249 mg/dL - 4 units<BR>Blood glucose 250-299 mg/dL - 6 units<BR>Blood glucose 300-349 mg/dL - 7 units<BR>Blood glucose 350-400 mg/dL - 8 units<BR>Blood glucose greater than 400 mg/dL - 9 units and call provider<BR> Caution: Look alike/sound alike drug alert    Route: Subcutaneous    Reason for Discontinue: Patient Transfer    lactated ringers bolus 500 mL (Discontinued) 500 mL Once As Needed 12/19/2019 12/19/2019    Route: Intravenous    Reason for Discontinue: Patient Transfer    lactated ringers bolus 500 mL (Discontinued) 500 mL Once As Needed 12/19/2019 12/19/2019    Route: Intravenous    Reason for Discontinue: Patient Transfer    lactated ringers infusion (Discontinued) 9 mL/hr Continuous 12/19/2019 12/19/2019    Admin Instructions: May switch to NS IV at [facility] if renal / if indicated    Route: Intravenous    Reason for Discontinue: Patient Transfer    ondansetron (ZOFRAN) injection 4 mg  "(Discontinued) 4 mg Once As Needed 12/19/2019 12/19/2019    Admin Instructions: If BOTH ondansetron (ZOFRAN) and promethazine (PHENERGAN) are ordered use ondansetron first and THEN promethazine IF ondansetron is ineffective.    Route: Intravenous    Reason for Discontinue: Patient Transfer    promethazine (PHENERGAN) injection 12.5 mg (Discontinued) 12.5 mg Once As Needed 12/19/2019 12/19/2019    Admin Instructions: If BOTH ondansetron (ZOFRAN) and promethazine (PHENERGAN) are ordered use ondansetron first and THEN promethazine IF ondansetron is ineffective.<BR>If giving IV, dilute dose in 20 mL NS and slow IV push over 3-5 minutes.  Administer through large bore vein (not hand or wrist) through running IV line at port furthest from patient's vein.    Route: Intravenous    Reason for Discontinue: Patient Transfer    Linked Group 5:  \"Or\" Linked Group Details        promethazine (PHENERGAN) injection 12.5 mg (Discontinued) 12.5 mg Once As Needed 12/19/2019 12/19/2019    Admin Instructions: If BOTH ondansetron (ZOFRAN) and promethazine (PHENERGAN) are ordered use ondansetron first and THEN promethazine IF ondansetron is ineffective.<BR>If giving IV, dilute dose in 20 mL NS and slow IV push over 3-5 minutes.  Administer through large bore vein (not hand or wrist) through running IV line at port furthest from patient's vein.    Route: Intramuscular    Reason for Discontinue: Patient Transfer    Linked Group 5:  \"Or\" Linked Group Details        promethazine (PHENERGAN) suppository 25 mg (Discontinued) 25 mg Once As Needed 12/19/2019 12/19/2019    Admin Instructions: If BOTH ondansetron (ZOFRAN) and promethazine (PHENERGAN) are ordered use ondansetron first and THEN promethazine IF ondansetron is ineffective.    Route: Rectal    Reason for Discontinue: Patient Transfer    Linked Group 5:  \"Or\" Linked Group Details        promethazine (PHENERGAN) tablet 25 mg (Discontinued) 25 mg Once As Needed 12/19/2019 12/19/2019    " "Admin Instructions: If BOTH ondansetron (ZOFRAN) and promethazine (PHENERGAN) are ordered use ondansetron first and THEN promethazine IF ondansetron is ineffective.<BR>    Route: Oral    Reason for Discontinue: Patient Transfer    Linked Group 5:  \"Or\" Linked Group Details        Scopolamine (TRANSDERM-SCOP) 1.5 MG/3DAYS patch 1 patch (Discontinued) 1 patch Once 12/19/2019 12/19/2019    Admin Instructions:     Route: Transdermal    sodium chloride (NS) irrigation solution (Discontinued)  As Needed 12/19/2019 12/19/2019    Reason for Discontinue: Patient Discharge    sodium chloride 0.9 % infusion (Discontinued) 150 mL/hr Continuous 12/19/2019 12/19/2019    Route: Intravenous    Reason for Discontinue: Patient Transfer    sterile water irrigation solution (Discontinued)  As Needed 12/19/2019 12/19/2019    Reason for Discontinue: Patient Discharge            Lab Results (last 24 hours)     Procedure Component Value Units Date/Time    POC Glucose Once [[id number]]  (Normal) Collected:  12/19/19 0807    Specimen:  Blood Updated:  12/19/19 0818     Glucose 117 mg/dL         Imaging Results (Last 24 Hours)     ** No results found for the last 24 hours. **           Operative/Procedure Notes (all)      Narendra Jacobs MD at 12/19/19 0845  Version 1 of 1       HIATAL HERNIA REPAIR LAPAROSCOPIC, GASTROJEJUNOSTOMY LAPAROSCOPIC, ESOPHAGOGASTRODUODENOSCOPY  Progress Note    Conchita Acevedo  12/19/2019    Pre-op Diagnosis:   Hiatal hernia with gastroesophageal reflux [K21.9, K44.9]  Partial gastric outlet obstruction [K31.1]       Post-Op Diagnosis Codes:     * Hiatal hernia with gastroesophageal reflux [K21.9, K44.9]     * Partial gastric outlet obstruction [K31.1]    Procedure/CPT® Codes:  ND LAP GASTRIC BYPASS/RONNI-EN-Y [10252]  ND LAP,ESOPHAGOGAST FUNDOPLASTY [33549]  ND ESOPHAGOGASTRODUODENOSCOPY TRANSORAL DIAGNOSTIC [90301]    Procedure(s):  HIATAL HERNIA REPAIR LAPAROSCOPIC  GASTROJEJUNOSTOMY " LAPAROSCOPIC  ESOPHAGOGASTRODUODENOSCOPY    Surgeon(s):  Narendra Jacobs MD   Asst:  Logan Contreras MD PGY-4    Anesthesia: General with Block    Staff:   Circulator: Nancy Arias RN; Ely Allen RN; Sarah Vazquez RN  Scrub Person: Ross Saenz; Virginia Barkley; Krish Mary  Nursing Assistant: Andria Duran    Estimated Blood Loss: 20 mL    Urine Voided: * No values recorded between 12/19/2019  8:53 AM and 12/19/2019 12:12 PM *    Specimens:                None          Drains: * No LDAs found *    Findings:     Complications: none      Narendra Jacobs MD     Date: 12/19/2019  Time: 12:12 PM        Electronically signed by Narendra Jacobs MD at 12/19/19 1212     Narendra Jacobs MD at 12/19/19 0845  Version 2 of 2       Preoperative diagnosis:   Recurrent hiatal hernia and partial gastric outlet obstruction status post laparoscopic sleeve gastrectomy July 2010, Lap HHR/sleeve revision 7/15     Postoperative diagnosis:  Same     Procedure:   Laparoscopic  proximal Afshan-en-Y gastrojejunostomy                       Laparoscopic recurrent hiatal hernia repair (not paraesophageal)                       EGD     Surgeon:   Narendra Jacobs MD    Asst:    Logan Contreras MD PGY-4        Anesth:  GETA     EBL:   100 cc     Specimens:  None     Drains:  None     Counts:  Correct     Complics:  None     Indications:  This is a 47-year-old previously morbidly obese white female known to me status post laparoscopic sleeve gastrectomy in July 2010, laparoscopic cholecystectomy in April 2011 and laparoscopic hiatal hernia repair with resection of redundant cardia in July 2015.  Her reflux symptoms resolved for about 2-1/2 years but have come back and are not well controlled with maximal medical therapy.  Upper GI shows a very small intermittent sliding-type hiatal hernia less than 2 cm in market reflux and a dilated esophagus to the level of the cervical esophagus.  EGD revealed a tiny  intermittent recurrent sliding hiatal hernia with irregular Z line which was at 34 cm distal esophageal biopsies were consistent with reflux.  Please see our office notes  Risks benefits alternative therapies were discussed and she was not interested in conversion to a Afshan-en-Y gastric bypass but is agreeable to laparoscopic recurrent hiatal hernia repair and if her sleeve appears to still be partially obstructed then pursue a laparoscopic Afshan-en-Y gastrojejunostomy with the understanding that this may not alleviate her symptoms.     Operative Technique:   The patient was brought to the operating room and placed supine upon the operating room table, SCD hose were placed, she underwent uneventful general endotracheal anesthesia per the anesthesiology staff, she received IV ancef and subcutaneous heparin 5000u, and her abdomen was prepped and draped with ChloraPrep in a sterile fashion, an Ioban was used.   The peritoneal cavity was entered in the upper abdomen to left of midline using an 11 mm trocar and an Optiview technique and the abdomen was insufflated to a pressure of 15 mmHg with CO2 gas.  Exploratory laparoscopy revealed no evidence of injury from the entrance technique, a floppy mildly enlarged liver, status post cholecystectomy.  Remaining trochars were placed under direct visualization including an additional 11 mm trocar in the left midabdomen and 5 mm trochars in the right upper quadrant and left subcostal position.  Through a stab incision in the epigastrium a Jessy retractor was used to elevate the left lobe of the liver where there were dense adhesions from the undersurface to the lesser omentum and proximal sleeve.  These adhesions were lysed with cautery and the LigaSure device eventually exposing the hiatus.  Some oozing from the liver capsule was treated with FloSeal and Ray-Kathryn's as needed.  Not an obvious recurrent hiatal hernia and the hiatus was photo documented.  Omental adhesions to  the lateral sleeve were divided exposing the left mundo.  The hernia sac was incised along the base of the right mundo and this was extended up and across the phrenoesophageal membrane.  This was done on the left side as well.  The hernia sac and its contents were then dissected out of the mediastinum, there was not a paraesophageal component.  A lot of scarring from previous dissection no evidence of infection.  Half inch Penrose drain was used temporarily for esophageal retraction.  The anterior and posterior vagus nerves were preserved.  The crura were dissected their meeting point inferiorly.  The hiatal hernia repair was performed posteriorly with a single interrupted 0 silk suture with good result photodocumentation obtained before and after repair.  The sleeve clearly was kinked at the angularis.  Lysis of adhesions was performed trying to straighten this out without success.  Several photos obtained.  I elected to proceed with laparoscopic proximal gastrojejunostomy.  The right upper quadrant 5 mm trocar was changed out to a fascial splitting 12 mm trocar under direct visualization and the 5 mm trocar was placed in the right midabdomen under direct visualization.    The omentum was elevated in the ligament of Treitz identified and run distally for approximately 50 cm in the small bowel in this area was divided with an [place] 60 mm articulating electric stapler with a blue load and a single absorbable Veritas marilyn-strip.  The underlying mesentery was divided for short distance.  The tip of the Afshan limb became ischemic and was excised and retrieved through the 12 mm trocar site incision without widening the fascia and discarded.  The Afshan limb was run for 125 cm.  A side to side jejunojejunostomy was then performed by making an enterotomy on the antimesenteric border of each limb and creating a common lumen with a 60 mm blue load with a single absorbable strip.  The intervening enterotomy was closed in 2  layers using running 2-0 Vicryl plus suture.    The omentum was divided anteriorly and the Afshan limb brought up and appropriate orientation where it reach nicely to the proximal sleeve without tension.  A side to side gastrojejunostomy was then performed from the end of the Afshan limb to the proximal sleeve.  A 36 Maltese bougie dilator was passed by the anesthesiology staff and manipulated into the antrum.  The Afshan limb was tacked to the proximal sleeve with an interrupted 2-0 silk suture.  A gastrotomy was made as well as a small bowel enterotomy in the Afshan limb and then a common lumen was created with the 60 mm stapler with a green load and a single absorbable Veritas marilyn-strip.  The intervening enterotomy was closed in 2 layers using a running 2-0 Vicryl plus suture.  The anastomosis appeared to rest nicely without tension or torsion.  The bougie dilator was removed.  There was some excess Afshan limb candycane approximately 10 cm.  The underlying mesentery was divided with the LigaSure and the Afshan limb was divided a couple centimeters away from the gastrojejunostomy with a blue load with a single absorbable strip.  The small bowel was retrieved through the 12 mm trocar site incision once again without widening the fascia and discarded.    The Afshan limb was occluded with a noncrushing bowel clamp and the anastomosis was submerged under saline and I performed upper endoscopy with a standard flexible endoscope.  No air bubbles or leak seen, no bleeding from the staple line, no ischemia of the anastomosis, gastric mucosa, or small bowel mucosa.  I was able to enter the Afshan limb and the native sleeve gastrectomy.  No hiatal hernia.  I did obtain some endoscopic photos but there appeared to be some technical difficulties and printing these at the time of dictation.  The endoscope was withdrawn.    Irrigation fluid was suctioned free.  The potential mesenteric defect at Mejias's space was closed using a running 2-0  silk suture.  The potential mesenteric defect at the jejunojejunostomy was closed with a running 2-0 silk suture.  All quadrants inspected no other abnormalities noted hemostasis excellent.  The liver had been hemostatic for quite some time.  Remaining additional FloSeal was placed around the anastomosis rather than waste it.  All trocars were removed under direct visualization, no bleeding noted from their sites.  Operative time was around 3 hours and the patient was redosed with Ancef.    The abdomen was desufflated of gas and skin in each incision was closed using 3-0 Monocryl plus in an interrupted subcuticular stitch followed by skin affix.  The patient tolerated the procedure well without complication and was taken to the recovery room in stable condition.    Electronically signed by Narendra Jacobs MD at 12/19/19 1376     Narendra Jacobs MD at 12/19/19 5659  Version 1 of 2       Preoperative diagnosis:   Recurrent hiatal hernia and partial gastric outlet obstruction status post laparoscopic sleeve gastrectomy July 2010, Lap HHR/sleeve revision 7/15     Postoperative diagnosis:  Same     Procedure:   Laparoscopic  proximal Afshan-en-Y gastrojejunostomy                       Laparoscopic recurrent hiatal hernia repair (not paraesophageal)                       EGD     Surgeon:   Narendra Jacobs MD    Asst:    Logan Contreras MD PGY-4        Anesth:  GETA     EBL:   100 cc     Specimens:  None     Drains:  None     Counts:  Correct     Complics:  None     Indications:  This is a 47-year-old previously morbidly obese white female known to me status post laparoscopic sleeve gastrectomy in July 2010, laparoscopic cholecystectomy in April 2011 and laparoscopic hiatal hernia repair with resection of redundant cardia in July 2015.  Her reflux symptoms resolved for about 2-1/2 years but have come back and are not well controlled with maximal medical therapy.  Upper GI shows a very small intermittent  sliding-type hiatal hernia less than 2 cm in market reflux and a dilated esophagus to the level of the cervical esophagus.  EGD revealed a tiny intermittent recurrent sliding hiatal hernia with irregular Z line which was at 34 cm distal esophageal biopsies were consistent with reflux.  Please see our office notes  Risks benefits alternative therapies were discussed and she was not interested in conversion to a Afshan-en-Y gastric bypass but is agreeable to laparoscopic recurrent hiatal hernia repair and if her sleeve appears to still be partially obstructed then pursue a laparoscopic Afshan-en-Y gastrojejunostomy with the understanding that this may not alleviate her symptoms.     Operative Technique:   The patient was brought to the operating room and placed supine upon the operating room table, SCD hose were placed, she underwent uneventful general endotracheal anesthesia per the anesthesiology staff, she received IV ancef and subcutaneous heparin 5000u, and her abdomen was prepped and draped with ChloraPrep in a sterile fashion, an Ioban was used.   The peritoneal cavity was entered in the upper abdomen to left of midline using an 11 mm trocar and an Optiview technique and the abdomen was insufflated to a pressure of 15 mmHg with CO2 gas.  Exploratory laparoscopy revealed no evidence of injury from the entrance technique, a floppy mildly enlarged liver, status post cholecystectomy.  Remaining trochars were placed under direct visualization including an additional 11 mm trocar in the left midabdomen and 5 mm trochars in the right upper quadrant and left subcostal position.  Through a stab incision in the epigastrium a Jessy retractor was used to elevate the left lobe of the liver where there were dense adhesions from the undersurface to the lesser omentum and proximal sleeve.  These adhesions were lysed with cautery and the LigaSure device eventually exposing the hiatus.  Some oozing from the liver capsule was  treated with FloSeal and Ray-Kathryn's as needed.  Not an obvious recurrent hiatal hernia and the hiatus was photo documented.  Omental adhesions to the lateral sleeve were divided exposing the left mundo.  The hernia sac was incised along the base of the right mundo and this was extended up and across the phrenoesophageal membrane.  This was done on the left side as well.  The hernia sac and its contents were then dissected out of the mediastinum, there was not a paraesophageal component.  A lot of scarring from previous dissection no evidence of infection.  Half inch Penrose drain was used temporarily for esophageal retraction.  The anterior and posterior vagus nerves were preserved.  The crura were dissected their meeting point inferiorly.  The hiatal hernia repair was performed posteriorly with a single interrupted 0 silk suture with good result photodocumentation obtained before and after repair.  The sleeve clearly was kinked at the angularis.  Lysis of adhesions was performed trying to straighten this out without success.  Several photos obtained.  I elected to proceed with laparoscopic proximal gastrojejunostomy.  The right upper quadrant 5 mm trocar was changed out to a fascial splitting 12 mm trocar under direct visualization and the 5 mm trocar was placed in the right midabdomen under direct visualization.    The omentum was elevated in the ligament of Treitz identified and run distally for approximately 50 cm in the small bowel in this area was divided with an [place] 60 mm articulating electric stapler with a blue load and a single absorbable Veritas marilyn-strip.  The underlying mesentery was divided for short distance.  The tip of the Afshan limb became ischemic and was excised and retrieved through the 12 mm trocar site incision without widening the fascia and discarded.  The Afshan limb was run for 125 cm.  A side to side jejunojejunostomy was then performed by making an enterotomy on the antimesenteric border  of each limb and creating a common lumen with a 60 mm blue load with a single absorbable strip.  The intervening enterotomy was closed in 2 layers using running 2-0 Vicryl plus suture.    The omentum was divided anteriorly and the Afshan limb brought up and appropriate orientation where it reach nicely to the proximal sleeve without tension.  A side to side gastrojejunostomy was then performed from the end of the Afshan limb to the proximal sleeve.  A 36 Spanish bougie dilator was passed by the anesthesiology staff and manipulated into the antrum.  The Afshan limb was tacked to the proximal sleeve with an interrupted 2-0 silk suture.  A gastrotomy was made as well as a small bowel enterotomy in the Afshan limb and then a common lumen was created with the 60 mm stapler with a green load and a single absorbable Veritas marilyn-strip.  The intervening enterotomy was closed in 2 layers using a running 2-0 Vicryl plus suture.  The anastomosis appeared to rest nicely without tension or torsion.  The bougie dilator was removed.  There was some excess Afshan limb candycane approximately 10 cm.  The underlying mesentery was divided with the LigaSure and the Afshan limb was divided a couple centimeters away from the gastrojejunostomy with a blue load with a single absorbable strip.  The small bowel was retrieved through the 12 mm trocar site incision once again without widening the fascia and discarded.    The Afshan limb was occluded with a noncrushing bowel clamp and the anastomosis was submerged under saline and I performed upper endoscopy with a standard flexible endoscope.  No air bubbles or leak seen, no bleeding from the staple line, no ischemia of the anastomosis, gastric mucosa, or small bowel mucosa.  I was able to enter the Afshan limb and the native sleeve gastrectomy.  No hiatal hernia.  I did obtain some endoscopic photos but there appeared to be some technical difficulties and printing these at the time of dictation.  The  endoscope was withdrawn.    Irrigation fluid was suctioned free.  The potential mesenteric defect at Mejias's space was closed using a running 2-0 silk suture.  The potential mesenteric defect at the jejunojejunostomy was closed with a running 2-0 silk suture.  All quadrants inspected no other abnormalities noted hemostasis excellent.  The liver had been hemostatic for quite some time.  Remaining additional FloSeal was placed around the anastomosis rather than waste it.  All trocars were removed under direct visualization, no bleeding noted from their sites.     The abdomen was desufflated of gas and skin in each incision was closed using 3-0 Monocryl plus in an interrupted subcuticular stitch followed by skin affix.  The patient tolerated the procedure well without complication and was taken to the recovery room in stable condition.    Electronically signed by Narendra Jacobs MD at 12/19/19 [id number]       Physician Progress Notes (all)    No notes of this type exist for this encounter.         Consult Notes (all)    No notes of this type exist for this encounter.

## 2019-12-25 ENCOUNTER — OUTPATIENT (OUTPATIENT)
Dept: OUTPATIENT SERVICES | Facility: HOSPITAL | Age: 84
LOS: 1 days | Discharge: HOME | End: 2019-12-25

## 2019-12-25 DIAGNOSIS — E78.5 HYPERLIPIDEMIA, UNSPECIFIED: ICD-10-CM

## 2019-12-25 DIAGNOSIS — E87.5 HYPERKALEMIA: ICD-10-CM

## 2019-12-25 DIAGNOSIS — E03.9 HYPOTHYROIDISM, UNSPECIFIED: ICD-10-CM

## 2019-12-25 DIAGNOSIS — E87.6 HYPOKALEMIA: ICD-10-CM

## 2023-01-12 NOTE — ED ADULT NURSE NOTE - TEMPLATE LIST FOR HEAD TO TOE ASSESSMENT
Courtesy refill given     LOV:11/08/22    3 months (around 2/8/2023) for recheck  weight.    Pending Visit: 03/06/23    *  metoPROLOL   Last filled: 10/18/22 QTY: 90     Last eGFR done on 11/02/22: 76 KATELYN done at [facility] Last BP on 11/08/22: 128/82      * lisinopril   Last filled: 10/18/22 QTY: 90  Last BP on 11/02/22: 128/82  Last Potassium done 11/08/22: 4.0 KATELYN done at [facility]         
Neuro

## 2023-04-03 NOTE — ED ADULT TRIAGE NOTE - PRO INTERPRETER NEED 2
Called Pt to inform that [facility] has a walk in lab system. No answer, left detailed VM for Pt requesting that she get labs there, provided lab names and provider while noting the lab should have no issue seeing them.     Pt called back and we discussed what lab was most convenient for the Pt. She prefers [facility], will go today, knows the labs we need are the only to in the system.    English
